# Patient Record
Sex: FEMALE | Race: WHITE | URBAN - METROPOLITAN AREA
[De-identification: names, ages, dates, MRNs, and addresses within clinical notes are randomized per-mention and may not be internally consistent; named-entity substitution may affect disease eponyms.]

---

## 2017-02-22 ENCOUNTER — OUTPATIENT (OUTPATIENT)
Dept: OUTPATIENT SERVICES | Facility: HOSPITAL | Age: 82
LOS: 1 days | Discharge: HOME | End: 2017-02-22
Payer: MEDICARE

## 2017-02-22 VITALS
TEMPERATURE: 98 F | SYSTOLIC BLOOD PRESSURE: 132 MMHG | HEIGHT: 70 IN | WEIGHT: 145.95 LBS | OXYGEN SATURATION: 98 % | HEART RATE: 61 BPM | DIASTOLIC BLOOD PRESSURE: 80 MMHG | RESPIRATION RATE: 20 BRPM

## 2017-02-22 DIAGNOSIS — Z96.642 PRESENCE OF LEFT ARTIFICIAL HIP JOINT: ICD-10-CM

## 2017-02-22 DIAGNOSIS — E89.0 POSTPROCEDURAL HYPOTHYROIDISM: Chronic | ICD-10-CM

## 2017-02-22 DIAGNOSIS — Z79.82 LONG TERM (CURRENT) USE OF ASPIRIN: ICD-10-CM

## 2017-02-22 DIAGNOSIS — Z98.890 OTHER SPECIFIED POSTPROCEDURAL STATES: Chronic | ICD-10-CM

## 2017-02-22 DIAGNOSIS — E03.9 HYPOTHYROIDISM, UNSPECIFIED: ICD-10-CM

## 2017-02-22 DIAGNOSIS — Z98.49 CATARACT EXTRACTION STATUS, UNSPECIFIED EYE: Chronic | ICD-10-CM

## 2017-02-22 DIAGNOSIS — I44.7 LEFT BUNDLE-BRANCH BLOCK, UNSPECIFIED: ICD-10-CM

## 2017-02-22 DIAGNOSIS — M17.11 UNILATERAL PRIMARY OSTEOARTHRITIS, RIGHT KNEE: ICD-10-CM

## 2017-02-22 DIAGNOSIS — E78.5 HYPERLIPIDEMIA, UNSPECIFIED: ICD-10-CM

## 2017-02-22 DIAGNOSIS — Z96.642 PRESENCE OF LEFT ARTIFICIAL HIP JOINT: Chronic | ICD-10-CM

## 2017-02-22 DIAGNOSIS — Z01.818 ENCOUNTER FOR OTHER PREPROCEDURAL EXAMINATION: ICD-10-CM

## 2017-02-22 LAB
ANION GAP SERPL CALC-SCNC: 6 MMOL/L — SIGNIFICANT CHANGE UP (ref 5–17)
APPEARANCE UR: CLEAR — SIGNIFICANT CHANGE UP
BACTERIA # UR AUTO: (no result)
BASOPHILS # BLD AUTO: 0.1 K/UL — SIGNIFICANT CHANGE UP (ref 0–0.2)
BASOPHILS NFR BLD AUTO: 1.3 % — SIGNIFICANT CHANGE UP (ref 0–2)
BILIRUB UR-MCNC: NEGATIVE — SIGNIFICANT CHANGE UP
BLD GP AB SCN SERPL QL: SIGNIFICANT CHANGE UP
BUN SERPL-MCNC: 22 MG/DL — SIGNIFICANT CHANGE UP (ref 7–23)
CALCIUM SERPL-MCNC: 9.7 MG/DL — SIGNIFICANT CHANGE UP (ref 8.5–10.1)
CHLORIDE SERPL-SCNC: 107 MMOL/L — SIGNIFICANT CHANGE UP (ref 96–108)
CO2 SERPL-SCNC: 28 MMOL/L — SIGNIFICANT CHANGE UP (ref 22–31)
COLOR SPEC: YELLOW — SIGNIFICANT CHANGE UP
CREAT SERPL-MCNC: 0.7 MG/DL — SIGNIFICANT CHANGE UP (ref 0.5–1.3)
DIFF PNL FLD: (no result)
EOSINOPHIL # BLD AUTO: 0.1 K/UL — SIGNIFICANT CHANGE UP (ref 0–0.5)
EOSINOPHIL NFR BLD AUTO: 2.3 % — SIGNIFICANT CHANGE UP (ref 0–6)
EPI CELLS # UR: SIGNIFICANT CHANGE UP
GLUCOSE SERPL-MCNC: 91 MG/DL — SIGNIFICANT CHANGE UP (ref 70–99)
GLUCOSE UR QL: NEGATIVE MG/DL — SIGNIFICANT CHANGE UP
HCT VFR BLD CALC: 36.6 % — SIGNIFICANT CHANGE UP (ref 34.5–45)
HGB BLD-MCNC: 12.1 G/DL — SIGNIFICANT CHANGE UP (ref 11.5–15.5)
INR BLD: 1.03 RATIO — SIGNIFICANT CHANGE UP (ref 0.88–1.16)
KETONES UR-MCNC: NEGATIVE — SIGNIFICANT CHANGE UP
LEUKOCYTE ESTERASE UR-ACNC: NEGATIVE — SIGNIFICANT CHANGE UP
LYMPHOCYTES # BLD AUTO: 1.2 K/UL — SIGNIFICANT CHANGE UP (ref 1–3.3)
LYMPHOCYTES # BLD AUTO: 22.8 % — SIGNIFICANT CHANGE UP (ref 13–44)
MCHC RBC-ENTMCNC: 30 PG — SIGNIFICANT CHANGE UP (ref 27–34)
MCHC RBC-ENTMCNC: 33 GM/DL — SIGNIFICANT CHANGE UP (ref 32–36)
MCV RBC AUTO: 90.8 FL — SIGNIFICANT CHANGE UP (ref 80–100)
MONOCYTES # BLD AUTO: 0.7 K/UL — SIGNIFICANT CHANGE UP (ref 0–0.9)
MONOCYTES NFR BLD AUTO: 12.1 % — SIGNIFICANT CHANGE UP (ref 2–14)
MRSA PCR RESULT.: SIGNIFICANT CHANGE UP
NEUTROPHILS # BLD AUTO: 3.4 K/UL — SIGNIFICANT CHANGE UP (ref 1.8–7.4)
NEUTROPHILS NFR BLD AUTO: 61.6 % — SIGNIFICANT CHANGE UP (ref 43–77)
NITRITE UR-MCNC: NEGATIVE — SIGNIFICANT CHANGE UP
PH UR: 6.5 — SIGNIFICANT CHANGE UP (ref 4.8–8)
PLATELET # BLD AUTO: 211 K/UL — SIGNIFICANT CHANGE UP (ref 150–400)
POTASSIUM SERPL-MCNC: 4.8 MMOL/L — SIGNIFICANT CHANGE UP (ref 3.5–5.3)
POTASSIUM SERPL-SCNC: 4.8 MMOL/L — SIGNIFICANT CHANGE UP (ref 3.5–5.3)
PROT UR-MCNC: NEGATIVE MG/DL — SIGNIFICANT CHANGE UP
PROTHROM AB SERPL-ACNC: 11.3 SEC — SIGNIFICANT CHANGE UP (ref 10–13.1)
RBC # BLD: 4.03 M/UL — SIGNIFICANT CHANGE UP (ref 3.8–5.2)
RBC # FLD: 12.4 % — SIGNIFICANT CHANGE UP (ref 10.3–14.5)
RBC CASTS # UR COMP ASSIST: (no result) /HPF (ref 0–4)
S AUREUS DNA NOSE QL NAA+PROBE: SIGNIFICANT CHANGE UP
SODIUM SERPL-SCNC: 141 MMOL/L — SIGNIFICANT CHANGE UP (ref 135–145)
SP GR SPEC: 1.01 — SIGNIFICANT CHANGE UP (ref 1.01–1.02)
TYPE + AB SCN PNL BLD: SIGNIFICANT CHANGE UP
UROBILINOGEN FLD QL: NEGATIVE MG/DL — SIGNIFICANT CHANGE UP
WBC # BLD: 5.4 K/UL — SIGNIFICANT CHANGE UP (ref 3.8–10.5)
WBC # FLD AUTO: 5.4 K/UL — SIGNIFICANT CHANGE UP (ref 3.8–10.5)
WBC UR QL: SIGNIFICANT CHANGE UP

## 2017-02-22 PROCEDURE — 71020: CPT | Mod: 26

## 2017-02-22 PROCEDURE — 93010 ELECTROCARDIOGRAM REPORT: CPT

## 2017-02-22 NOTE — H&P PST ADULT - PSH
H/O breast biopsy  15 yrs ago  H/O cataract extraction  b/l 2009  H/O hernia repair  2x   10 yrs ago, 5 yrs ago  H/O partial thyroidectomy  1964  H/O: knee surgery  right knee meniscectomy - 5 yrs ago  left knee surgery - 40 yrs ago  History of hip replacement, total, left  2009  History of shoulder surgery  right shoulder open surgery - 8 yrs ago  S/P coronary angiogram  [facility]2016 [facility]  Pt has MYNX vascular closure device H/O breast biopsy  15 yrs ago  H/O cataract extraction  b/l 2009  H/O hernia repair  2x   10 yrs ago, 5 yrs ago  H/O partial thyroidectomy  1964  H/O: knee surgery  right knee meniscectomy - 5 yrs ago  left knee surgery - 40 yrs ago  History of hip replacement, total, left  2009  History of shoulder surgery  right shoulder open surgery - 8 yrs ago  S/P coronary angiogram  [facility]  Pt has MYNX vascular closure device

## 2017-02-22 NOTE — H&P PST ADULT - NSANTHOSAYNRD_GEN_A_CORE
No. LASHELL screening performed.  STOP BANG Legend: 0-2 = LOW Risk; 3-4 = INTERMEDIATE Risk; 5-8 = HIGH Risk

## 2017-02-22 NOTE — H&P PST ADULT - HISTORY OF PRESENT ILLNESS
81 y/o female with osteoarthritis of right knee. Complain of right knee pain. Pt has difficulty walking distances due to knee pain. Takes Tylenol with mild pain relief. Here today for PST for planned right TKR.

## 2017-02-22 NOTE — H&P PST ADULT - ASSESSMENT
83 y/o female with osteoarthritis of right knee. Scheduled for [facility] TKR with Dr. Giang.    Plan  1. Stop all NSAIDS, herbal supplements and vitamins for 7 days.  2. NPO at midnight.  3. Take the following medications (Toprol, Quinapril, Levothyroxine)  with small sips of water on the morning of your procedure/surgery.  4. Use EZ sponges as directed  5. Use mupirocin as directed

## 2017-02-22 NOTE — H&P PST ADULT - FAMILY HISTORY
Mother  Still living? No  Family history of colon cancer in mother, Age at diagnosis: Age Unknown     Grandparent  Still living? No  Family history of ischemic heart disease (IHD), Age at diagnosis: Age Unknown

## 2017-03-06 ENCOUNTER — RESULT REVIEW (OUTPATIENT)
Age: 82
End: 2017-03-06

## 2017-03-06 RX ORDER — ACETAMINOPHEN 500 MG
650 TABLET ORAL ONCE
Qty: 0 | Refills: 0 | Status: COMPLETED | OUTPATIENT
Start: 2017-03-07 | End: 2017-03-07

## 2017-03-06 RX ORDER — CELECOXIB 200 MG/1
200 CAPSULE ORAL ONCE
Qty: 0 | Refills: 0 | Status: COMPLETED | OUTPATIENT
Start: 2017-03-07 | End: 2017-03-07

## 2017-03-06 RX ORDER — FENTANYL CITRATE 50 UG/ML
50 INJECTION INTRAVENOUS
Qty: 0 | Refills: 0 | Status: DISCONTINUED | OUTPATIENT
Start: 2017-03-07 | End: 2017-03-09

## 2017-03-06 RX ORDER — ACETAMINOPHEN 500 MG
650 TABLET ORAL EVERY 6 HOURS
Qty: 0 | Refills: 0 | Status: DISCONTINUED | OUTPATIENT
Start: 2017-03-07 | End: 2017-03-09

## 2017-03-06 RX ORDER — SENNA PLUS 8.6 MG/1
2 TABLET ORAL AT BEDTIME
Qty: 0 | Refills: 0 | Status: DISCONTINUED | OUTPATIENT
Start: 2017-03-07 | End: 2017-03-09

## 2017-03-06 RX ORDER — OXYCODONE HYDROCHLORIDE 5 MG/1
10 TABLET ORAL ONCE
Qty: 0 | Refills: 0 | Status: DISCONTINUED | OUTPATIENT
Start: 2017-03-07 | End: 2017-03-09

## 2017-03-06 RX ORDER — CELECOXIB 200 MG/1
200 CAPSULE ORAL
Qty: 0 | Refills: 0 | Status: DISCONTINUED | OUTPATIENT
Start: 2017-03-07 | End: 2017-03-09

## 2017-03-06 RX ORDER — HYDROMORPHONE HYDROCHLORIDE 2 MG/ML
0.5 INJECTION INTRAMUSCULAR; INTRAVENOUS; SUBCUTANEOUS
Qty: 0 | Refills: 0 | Status: DISCONTINUED | OUTPATIENT
Start: 2017-03-07 | End: 2017-03-09

## 2017-03-06 RX ORDER — ONDANSETRON 8 MG/1
4 TABLET, FILM COATED ORAL ONCE
Qty: 0 | Refills: 0 | Status: DISCONTINUED | OUTPATIENT
Start: 2017-03-07 | End: 2017-03-09

## 2017-03-07 ENCOUNTER — INPATIENT (INPATIENT)
Facility: HOSPITAL | Age: 82
LOS: 1 days | Discharge: HOME HEALTH | End: 2017-03-09
Attending: ORTHOPAEDIC SURGERY | Admitting: ORTHOPAEDIC SURGERY
Payer: MEDICARE

## 2017-03-07 ENCOUNTER — TRANSCRIPTION ENCOUNTER (OUTPATIENT)
Age: 82
End: 2017-03-07

## 2017-03-07 VITALS
RESPIRATION RATE: 16 BRPM | SYSTOLIC BLOOD PRESSURE: 128 MMHG | WEIGHT: 145.95 LBS | OXYGEN SATURATION: 100 % | TEMPERATURE: 97 F | DIASTOLIC BLOOD PRESSURE: 78 MMHG | HEART RATE: 60 BPM | HEIGHT: 70 IN

## 2017-03-07 DIAGNOSIS — Z98.890 OTHER SPECIFIED POSTPROCEDURAL STATES: Chronic | ICD-10-CM

## 2017-03-07 DIAGNOSIS — E89.0 POSTPROCEDURAL HYPOTHYROIDISM: Chronic | ICD-10-CM

## 2017-03-07 DIAGNOSIS — Z98.49 CATARACT EXTRACTION STATUS, UNSPECIFIED EYE: Chronic | ICD-10-CM

## 2017-03-07 DIAGNOSIS — Z96.642 PRESENCE OF LEFT ARTIFICIAL HIP JOINT: Chronic | ICD-10-CM

## 2017-03-07 LAB
ANION GAP SERPL CALC-SCNC: 11 MMOL/L — SIGNIFICANT CHANGE UP (ref 5–17)
BUN SERPL-MCNC: 23 MG/DL — SIGNIFICANT CHANGE UP (ref 7–23)
CALCIUM SERPL-MCNC: 8.8 MG/DL — SIGNIFICANT CHANGE UP (ref 8.5–10.1)
CHLORIDE SERPL-SCNC: 110 MMOL/L — HIGH (ref 96–108)
CO2 SERPL-SCNC: 24 MMOL/L — SIGNIFICANT CHANGE UP (ref 22–31)
CREAT SERPL-MCNC: 0.77 MG/DL — SIGNIFICANT CHANGE UP (ref 0.5–1.3)
GLUCOSE SERPL-MCNC: 128 MG/DL — HIGH (ref 70–99)
HCT VFR BLD CALC: 32.5 % — LOW (ref 34.5–45)
HGB BLD-MCNC: 10.9 G/DL — LOW (ref 11.5–15.5)
INR BLD: 1.06 RATIO — SIGNIFICANT CHANGE UP (ref 0.88–1.16)
MCHC RBC-ENTMCNC: 30.8 PG — SIGNIFICANT CHANGE UP (ref 27–34)
MCHC RBC-ENTMCNC: 33.6 GM/DL — SIGNIFICANT CHANGE UP (ref 32–36)
MCV RBC AUTO: 91.7 FL — SIGNIFICANT CHANGE UP (ref 80–100)
PLATELET # BLD AUTO: 192 K/UL — SIGNIFICANT CHANGE UP (ref 150–400)
POTASSIUM SERPL-MCNC: 4.1 MMOL/L — SIGNIFICANT CHANGE UP (ref 3.5–5.3)
POTASSIUM SERPL-SCNC: 4.1 MMOL/L — SIGNIFICANT CHANGE UP (ref 3.5–5.3)
PROTHROM AB SERPL-ACNC: 11.7 SEC — SIGNIFICANT CHANGE UP (ref 10–13.1)
RBC # BLD: 3.54 M/UL — LOW (ref 3.8–5.2)
RBC # FLD: 12.4 % — SIGNIFICANT CHANGE UP (ref 10.3–14.5)
SODIUM SERPL-SCNC: 145 MMOL/L — SIGNIFICANT CHANGE UP (ref 135–145)
WBC # BLD: 4.6 K/UL — SIGNIFICANT CHANGE UP (ref 3.8–10.5)
WBC # FLD AUTO: 4.6 K/UL — SIGNIFICANT CHANGE UP (ref 3.8–10.5)

## 2017-03-07 PROCEDURE — 88305 TISSUE EXAM BY PATHOLOGIST: CPT | Mod: 26

## 2017-03-07 PROCEDURE — 73560 X-RAY EXAM OF KNEE 1 OR 2: CPT | Mod: 26,RT

## 2017-03-07 RX ORDER — QUINAPRIL HYDROCHLORIDE 40 MG/1
1 TABLET, FILM COATED ORAL
Qty: 0 | Refills: 0 | COMMUNITY

## 2017-03-07 RX ORDER — MORPHINE SULFATE 50 MG/1
2 CAPSULE, EXTENDED RELEASE ORAL EVERY 4 HOURS
Qty: 0 | Refills: 0 | Status: DISCONTINUED | OUTPATIENT
Start: 2017-03-07 | End: 2017-03-09

## 2017-03-07 RX ORDER — FOLIC ACID 0.8 MG
1 TABLET ORAL DAILY
Qty: 0 | Refills: 0 | Status: DISCONTINUED | OUTPATIENT
Start: 2017-03-07 | End: 2017-03-09

## 2017-03-07 RX ORDER — FENTANYL CITRATE 50 UG/ML
50 INJECTION INTRAVENOUS
Qty: 0 | Refills: 0 | Status: DISCONTINUED | OUTPATIENT
Start: 2017-03-07 | End: 2017-03-07

## 2017-03-07 RX ORDER — POLYETHYLENE GLYCOL 3350 17 G/17G
17 POWDER, FOR SOLUTION ORAL DAILY
Qty: 0 | Refills: 0 | Status: DISCONTINUED | OUTPATIENT
Start: 2017-03-07 | End: 2017-03-09

## 2017-03-07 RX ORDER — ONDANSETRON 8 MG/1
4 TABLET, FILM COATED ORAL ONCE
Qty: 0 | Refills: 0 | Status: DISCONTINUED | OUTPATIENT
Start: 2017-03-07 | End: 2017-03-07

## 2017-03-07 RX ORDER — METOPROLOL TARTRATE 50 MG
1 TABLET ORAL
Qty: 0 | Refills: 0 | COMMUNITY

## 2017-03-07 RX ORDER — MAGNESIUM HYDROXIDE 400 MG/1
30 TABLET, CHEWABLE ORAL DAILY
Qty: 0 | Refills: 0 | Status: DISCONTINUED | OUTPATIENT
Start: 2017-03-07 | End: 2017-03-09

## 2017-03-07 RX ORDER — DOCUSATE SODIUM 100 MG
100 CAPSULE ORAL EVERY 12 HOURS
Qty: 0 | Refills: 0 | Status: DISCONTINUED | OUTPATIENT
Start: 2017-03-07 | End: 2017-03-07

## 2017-03-07 RX ORDER — TRANEXAMIC ACID 100 MG/ML
662 INJECTION, SOLUTION INTRAVENOUS
Qty: 0 | Refills: 0 | Status: DISCONTINUED | OUTPATIENT
Start: 2017-03-07 | End: 2017-03-09

## 2017-03-07 RX ORDER — MULTIVIT-MIN/FERROUS GLUCONATE 9 MG/15 ML
1 LIQUID (ML) ORAL
Qty: 0 | Refills: 0 | COMMUNITY

## 2017-03-07 RX ORDER — CODEINE SULFATE 60 MG/1
15 TABLET ORAL EVERY 4 HOURS
Qty: 0 | Refills: 0 | Status: DISCONTINUED | OUTPATIENT
Start: 2017-03-07 | End: 2017-03-09

## 2017-03-07 RX ORDER — FERROUS SULFATE 325(65) MG
325 TABLET ORAL
Qty: 0 | Refills: 0 | Status: DISCONTINUED | OUTPATIENT
Start: 2017-03-07 | End: 2017-03-09

## 2017-03-07 RX ORDER — OXYCODONE HYDROCHLORIDE 5 MG/1
10 TABLET ORAL EVERY 12 HOURS
Qty: 0 | Refills: 0 | Status: DISCONTINUED | OUTPATIENT
Start: 2017-03-07 | End: 2017-03-09

## 2017-03-07 RX ORDER — OXYCODONE HYDROCHLORIDE 5 MG/1
5 TABLET ORAL EVERY 4 HOURS
Qty: 0 | Refills: 0 | Status: DISCONTINUED | OUTPATIENT
Start: 2017-03-07 | End: 2017-03-09

## 2017-03-07 RX ORDER — ASPIRIN/CALCIUM CARB/MAGNESIUM 324 MG
1 TABLET ORAL
Qty: 0 | Refills: 0 | COMMUNITY

## 2017-03-07 RX ORDER — VANCOMYCIN HCL 1 G
1000 VIAL (EA) INTRAVENOUS ONCE
Qty: 0 | Refills: 0 | Status: COMPLETED | OUTPATIENT
Start: 2017-03-07 | End: 2017-03-07

## 2017-03-07 RX ORDER — ACETAMINOPHEN 500 MG
650 TABLET ORAL EVERY 6 HOURS
Qty: 0 | Refills: 0 | Status: DISCONTINUED | OUTPATIENT
Start: 2017-03-07 | End: 2017-03-09

## 2017-03-07 RX ORDER — OXYCODONE HYDROCHLORIDE 5 MG/1
10 TABLET ORAL EVERY 4 HOURS
Qty: 0 | Refills: 0 | Status: DISCONTINUED | OUTPATIENT
Start: 2017-03-07 | End: 2017-03-09

## 2017-03-07 RX ORDER — OXYCODONE HYDROCHLORIDE 5 MG/1
5 TABLET ORAL EVERY 12 HOURS
Qty: 0 | Refills: 0 | Status: DISCONTINUED | OUTPATIENT
Start: 2017-03-07 | End: 2017-03-07

## 2017-03-07 RX ORDER — LEVOTHYROXINE SODIUM 125 MCG
112 TABLET ORAL DAILY
Qty: 0 | Refills: 0 | Status: DISCONTINUED | OUTPATIENT
Start: 2017-03-07 | End: 2017-03-09

## 2017-03-07 RX ORDER — SODIUM CHLORIDE 9 MG/ML
1000 INJECTION, SOLUTION INTRAVENOUS
Qty: 0 | Refills: 0 | Status: DISCONTINUED | OUTPATIENT
Start: 2017-03-07 | End: 2017-03-09

## 2017-03-07 RX ORDER — ASPIRIN/CALCIUM CARB/MAGNESIUM 324 MG
325 TABLET ORAL
Qty: 0 | Refills: 0 | Status: DISCONTINUED | OUTPATIENT
Start: 2017-03-07 | End: 2017-03-08

## 2017-03-07 RX ORDER — SODIUM CHLORIDE 9 MG/ML
3 INJECTION INTRAMUSCULAR; INTRAVENOUS; SUBCUTANEOUS EVERY 8 HOURS
Qty: 0 | Refills: 0 | Status: DISCONTINUED | OUTPATIENT
Start: 2017-03-07 | End: 2017-03-09

## 2017-03-07 RX ORDER — SODIUM CHLORIDE 9 MG/ML
1000 INJECTION, SOLUTION INTRAVENOUS
Qty: 0 | Refills: 0 | Status: DISCONTINUED | OUTPATIENT
Start: 2017-03-07 | End: 2017-03-07

## 2017-03-07 RX ORDER — ASCORBIC ACID 60 MG
500 TABLET,CHEWABLE ORAL
Qty: 0 | Refills: 0 | Status: DISCONTINUED | OUTPATIENT
Start: 2017-03-07 | End: 2017-03-09

## 2017-03-07 RX ORDER — SENNA PLUS 8.6 MG/1
2 TABLET ORAL AT BEDTIME
Qty: 0 | Refills: 0 | Status: DISCONTINUED | OUTPATIENT
Start: 2017-03-07 | End: 2017-03-09

## 2017-03-07 RX ORDER — LISINOPRIL 2.5 MG/1
10 TABLET ORAL DAILY
Qty: 0 | Refills: 0 | Status: DISCONTINUED | OUTPATIENT
Start: 2017-03-07 | End: 2017-03-09

## 2017-03-07 RX ORDER — OXYCODONE HYDROCHLORIDE 5 MG/1
10 TABLET ORAL EVERY 6 HOURS
Qty: 0 | Refills: 0 | Status: DISCONTINUED | OUTPATIENT
Start: 2017-03-07 | End: 2017-03-07

## 2017-03-07 RX ORDER — ONDANSETRON 8 MG/1
4 TABLET, FILM COATED ORAL EVERY 6 HOURS
Qty: 0 | Refills: 0 | Status: DISCONTINUED | OUTPATIENT
Start: 2017-03-07 | End: 2017-03-09

## 2017-03-07 RX ORDER — OMEGA-3 ACID ETHYL ESTERS 1 G
1 CAPSULE ORAL
Qty: 0 | Refills: 0 | COMMUNITY

## 2017-03-07 RX ORDER — OXYCODONE HYDROCHLORIDE 5 MG/1
5 TABLET ORAL EVERY 6 HOURS
Qty: 0 | Refills: 0 | Status: DISCONTINUED | OUTPATIENT
Start: 2017-03-07 | End: 2017-03-07

## 2017-03-07 RX ORDER — LEVOTHYROXINE SODIUM 125 MCG
1 TABLET ORAL
Qty: 0 | Refills: 0 | COMMUNITY

## 2017-03-07 RX ORDER — DOCUSATE SODIUM 100 MG
100 CAPSULE ORAL THREE TIMES A DAY
Qty: 0 | Refills: 0 | Status: DISCONTINUED | OUTPATIENT
Start: 2017-03-07 | End: 2017-03-09

## 2017-03-07 RX ORDER — METOPROLOL TARTRATE 50 MG
25 TABLET ORAL DAILY
Qty: 0 | Refills: 0 | Status: DISCONTINUED | OUTPATIENT
Start: 2017-03-07 | End: 2017-03-09

## 2017-03-07 RX ORDER — MORPHINE SULFATE 50 MG/1
4 CAPSULE, EXTENDED RELEASE ORAL ONCE
Qty: 0 | Refills: 0 | Status: DISCONTINUED | OUTPATIENT
Start: 2017-03-07 | End: 2017-03-09

## 2017-03-07 RX ADMIN — Medication 650 MILLIGRAM(S): at 18:22

## 2017-03-07 RX ADMIN — Medication 650 MILLIGRAM(S): at 19:26

## 2017-03-07 RX ADMIN — SODIUM CHLORIDE 3 MILLILITER(S): 9 INJECTION INTRAMUSCULAR; INTRAVENOUS; SUBCUTANEOUS at 22:00

## 2017-03-07 RX ADMIN — Medication 650 MILLIGRAM(S): at 08:01

## 2017-03-07 RX ADMIN — SENNA PLUS 2 TABLET(S): 8.6 TABLET ORAL at 21:51

## 2017-03-07 RX ADMIN — Medication 650 MILLIGRAM(S): at 23:26

## 2017-03-07 RX ADMIN — CELECOXIB 200 MILLIGRAM(S): 200 CAPSULE ORAL at 08:01

## 2017-03-07 RX ADMIN — Medication 325 MILLIGRAM(S): at 18:25

## 2017-03-07 RX ADMIN — Medication 100 MILLIGRAM(S): at 21:51

## 2017-03-07 RX ADMIN — Medication 325 MILLIGRAM(S): at 18:36

## 2017-03-07 RX ADMIN — Medication 250 MILLIGRAM(S): at 21:52

## 2017-03-07 RX ADMIN — Medication 500 MILLIGRAM(S): at 18:23

## 2017-03-07 RX ADMIN — SODIUM CHLORIDE 75 MILLILITER(S): 9 INJECTION, SOLUTION INTRAVENOUS at 13:13

## 2017-03-07 NOTE — PATIENT PROFILE ADULT. - PSH
H/O breast biopsy  15 yrs ago  H/O cataract extraction  b/l 2009  H/O hernia repair  2x   10 yrs ago, 5 yrs ago  H/O partial thyroidectomy  1964  H/O: knee surgery  right knee meniscectomy - 5 yrs ago  left knee surgery - 40 yrs ago  History of hip replacement, total, left  2009  History of shoulder surgery  right shoulder open surgery - 8 yrs ago  S/P coronary angiogram  [facility]  Pt has MYNX vascular closure device

## 2017-03-07 NOTE — DISCHARGE NOTE ADULT - MEDICATION SUMMARY - MEDICATIONS TO TAKE
I will START or STAY ON the medications listed below when I get home from the hospital:    aspirin 81 mg oral tablet  -- 1 tab(s) by mouth once a day  -- hold 1 week before surgery  -- Indication: For home med    Percocet 5/325 325 mg-5 mg oral tablet  -- 1 tab(s) by mouth every 4 hours, As Needed MDD:6 tabs for pain  -- Caution federal law prohibits the transfer of this drug to any person other  than the person for whom it was prescribed.  May cause drowsiness.  Alcohol may intensify this effect.  Use care when operating dangerous machinery.  This prescription cannot be refilled.  This product contains acetaminophen.  Do not use  with any other product containing acetaminophen to prevent possible liver damage.  Using more of this medication than prescribed may cause serious breathing problems.    -- Indication: For pain    quinapril 10 mg oral tablet  -- 1 tab(s) by mouth once a day  -- Indication: For home med    Lovenox 30 mg/0.3 mL injectable solution  -- 0.3 milligram(s) injectable 2 times a day, under the direction of Anticoagulation Management  -- It is very important that you take or use this exactly as directed.  Do not skip doses or discontinue unless directed by your doctor.    -- Indication: For DVT PPx    warfarin 5 mg oral tablet  -- 1 tab(s) by mouth once a day  -- Indication: For DVT PPx    Toprol-XL 25 mg oral tablet, extended release  -- 1 tab(s) by mouth once a day  -- Indication: For home med    red yeast rice 600 mg oral capsule  --  by mouth 3 times a day  -- Indication: For home med    Fish Oil  -- 1 cap(s) by mouth 2 times a day  -- Indication: For home med    levothyroxine 112 mcg (0.112 mg) oral tablet  -- 1 tab(s) by mouth once a day  -- Indication: For home med    Caltrate 600 + D oral tablet  -- 1 tab(s) by mouth 2 times a day  -- Indication: For home med    Centrum Silver oral tablet  -- 1 tab(s) by mouth once a day  -- Indication: For home med I will START or STAY ON the medications listed below when I get home from the hospital:    aspirin 81 mg oral tablet  -- 1 tab(s) by mouth once a day  -- hold 1 week before surgery  -- Indication: For home med    Percocet 5/325 325 mg-5 mg oral tablet  -- 1 tab(s) by mouth every 4 hours, As Needed MDD:6 tabs for pain  -- Caution federal law prohibits the transfer of this drug to any person other  than the person for whom it was prescribed.  May cause drowsiness.  Alcohol may intensify this effect.  Use care when operating dangerous machinery.  This prescription cannot be refilled.  This product contains acetaminophen.  Do not use  with any other product containing acetaminophen to prevent possible liver damage.  Using more of this medication than prescribed may cause serious breathing problems.    -- Indication: For pain    quinapril 10 mg oral tablet  -- 1 tab(s) by mouth once a day  -- Indication: For home med    Lovenox 30 mg/0.3 mL injectable solution  -- 0.3 milligram(s) injectable 2 times a day, under the direction of Anticoagulation Management  -- It is very important that you take or use this exactly as directed.  Do not skip doses or discontinue unless directed by your doctor.    -- Indication: For DVT PPx    warfarin 5 mg oral tablet  -- 1 tab(s) by mouth once a day  -- Indication: For DVT PPx    Toprol-XL 25 mg oral tablet, extended release  -- 1 tab(s) by mouth once a day  -- Indication: For home med    red yeast rice 600 mg oral capsule  --  by mouth 3 times a day  -- Indication: For home med    Fish Oil  -- 1 cap(s) by mouth 2 times a day  -- Indication: For home med    pantoprazole 20 mg oral delayed release tablet  -- 1 tab(s) by mouth once a day for GI ppx  -- It is very important that you take or use this exactly as directed.  Do not skip doses or discontinue unless directed by your doctor.  Obtain medical advice before taking any non-prescription drugs as some may affect the action of this medication.  Swallow whole.  Do not crush.    -- Indication: For GI ppx    levothyroxine 112 mcg (0.112 mg) oral tablet  -- 1 tab(s) by mouth once a day  -- Indication: For home med    Caltrate 600 + D oral tablet  -- 1 tab(s) by mouth 2 times a day  -- Indication: For home med    Centrum Silver oral tablet  -- 1 tab(s) by mouth once a day  -- Indication: For home med

## 2017-03-07 NOTE — PROGRESS NOTE ADULT - ASSESSMENT
83yo F s/p R TKA POD# 0  -pain control  -Drain DC'd in PACU after cell saver was DC'd  -no blood to reinfuse from cell saver  -DVT PPx  -WBAT  -PT  -FU Labs  -DC Planning

## 2017-03-07 NOTE — DISCHARGE NOTE ADULT - PLAN OF CARE
Return to ADLs 1.	Pain Control  2.	Walking with full weight bearing as tolerated, with assistive devices (walker/Cane as Needed)  3.	DVT Prophylaxis per anticoagulation team recommendations  4.	PT as needed  5.	Follow up with Dr. Giang as Outpatient in 10-14 Days after Discharge from the Hospital or Rehab. Call Office For Appointment.   6.	Remove Dressing and Staples Post-Op Day 14  7.	Ice affected area as Needed  8.	Keep Dressing Clean and dry.

## 2017-03-07 NOTE — DISCHARGE NOTE ADULT - PATIENT PORTAL LINK FT
“You can access the Follow[facility] Patient Portal, offered by [facility], by registering with the following website: [email or web address]”

## 2017-03-07 NOTE — DISCHARGE NOTE ADULT - CARE PROVIDER_API CALL
Tommy Giang (MD), Orthopaedic Surgery  [address] [place] [address]  Phone: [phone]  Fax: [phone]

## 2017-03-07 NOTE — DISCHARGE NOTE ADULT - CARE PLAN
Principal Discharge DX:	Osteoarthritis  Goal:	Return to ADLs  Instructions for follow-up, activity and diet:	1.	Pain Control  2.	Walking with full weight bearing as tolerated, with assistive devices (walker/Cane as Needed)  3.	DVT Prophylaxis per anticoagulation team recommendations  4.	PT as needed  5.	Follow up with Dr. Giang as Outpatient in 10-14 Days after Discharge from the Hospital or Rehab. Call Office For Appointment.   6.	Remove Dressing and Staples Post-Op Day 14  7.	Ice affected area as Needed  8.	Keep Dressing Clean and dry.

## 2017-03-07 NOTE — DISCHARGE NOTE ADULT - HOSPITAL COURSE
The patient is a 82 year old female status post elective total knee Arthroplasty to the right knee after failing outpatient nonoperative conservative management.  Patient presented to [facility] after being medically cleared for an elective surgical procedure. The patient was taken to the operating room on date mentioned above. Prophylactic antibiotics were started before the procedure and continued for 24 hours.  There were no complications during the procedure and patient tolerated the procedure well.  The patient was transferred to recovery room in stable condition and subsequently to surgical floor.  Patient was placed on Aspirin for anticoagulation.  All home medications were continued.  The patient received physical therapy daily and daily labs were followed.  The dressing was kept clean, dry, intact.  The rest of the hospital stay was unremarkable.

## 2017-03-07 NOTE — PROGRESS NOTE ADULT - SUBJECTIVE AND OBJECTIVE BOX
Pt S/E in PACU, tolerated procedure well, pain controlled, pt states she has a mild headache    AVSS  Gen: NAD, AAOx3    RLE:  Dressing clean dry intact  +EHL/FHL/TA/GS  SILT L3-S1  +DP/PT Pulses  Compartments soft  No calf TTP B/L

## 2017-03-07 NOTE — BRIEF OPERATIVE NOTE - POST-OP DX
Osteoarthritis of right knee, unspecified osteoarthritis type  03/07/2017    Active  Joaquin Martinez

## 2017-03-08 LAB
ANION GAP SERPL CALC-SCNC: 7 MMOL/L — SIGNIFICANT CHANGE UP (ref 5–17)
BUN SERPL-MCNC: 22 MG/DL — SIGNIFICANT CHANGE UP (ref 7–23)
CALCIUM SERPL-MCNC: 8.9 MG/DL — SIGNIFICANT CHANGE UP (ref 8.5–10.1)
CHLORIDE SERPL-SCNC: 108 MMOL/L — SIGNIFICANT CHANGE UP (ref 96–108)
CO2 SERPL-SCNC: 25 MMOL/L — SIGNIFICANT CHANGE UP (ref 22–31)
CREAT SERPL-MCNC: 0.78 MG/DL — SIGNIFICANT CHANGE UP (ref 0.5–1.3)
GLUCOSE SERPL-MCNC: 142 MG/DL — HIGH (ref 70–99)
HCT VFR BLD CALC: 28.2 % — LOW (ref 34.5–45)
HGB BLD-MCNC: 9.2 G/DL — LOW (ref 11.5–15.5)
MCHC RBC-ENTMCNC: 30.1 PG — SIGNIFICANT CHANGE UP (ref 27–34)
MCHC RBC-ENTMCNC: 32.8 GM/DL — SIGNIFICANT CHANGE UP (ref 32–36)
MCV RBC AUTO: 91.7 FL — SIGNIFICANT CHANGE UP (ref 80–100)
PLATELET # BLD AUTO: 192 K/UL — SIGNIFICANT CHANGE UP (ref 150–400)
POTASSIUM SERPL-MCNC: 4.1 MMOL/L — SIGNIFICANT CHANGE UP (ref 3.5–5.3)
POTASSIUM SERPL-SCNC: 4.1 MMOL/L — SIGNIFICANT CHANGE UP (ref 3.5–5.3)
RBC # BLD: 3.07 M/UL — LOW (ref 3.8–5.2)
RBC # FLD: 12.4 % — SIGNIFICANT CHANGE UP (ref 10.3–14.5)
SODIUM SERPL-SCNC: 140 MMOL/L — SIGNIFICANT CHANGE UP (ref 135–145)
SURGICAL PATHOLOGY FINAL REPORT - CH: SIGNIFICANT CHANGE UP
WBC # BLD: 10.4 K/UL — SIGNIFICANT CHANGE UP (ref 3.8–10.5)
WBC # FLD AUTO: 10.4 K/UL — SIGNIFICANT CHANGE UP (ref 3.8–10.5)

## 2017-03-08 PROCEDURE — 99223 1ST HOSP IP/OBS HIGH 75: CPT

## 2017-03-08 RX ORDER — DIPHENHYDRAMINE HCL 50 MG
50 CAPSULE ORAL EVERY 6 HOURS
Qty: 0 | Refills: 0 | Status: DISCONTINUED | OUTPATIENT
Start: 2017-03-08 | End: 2017-03-09

## 2017-03-08 RX ORDER — HEPARIN SODIUM 5000 [USP'U]/ML
5000 INJECTION INTRAVENOUS; SUBCUTANEOUS EVERY 8 HOURS
Qty: 0 | Refills: 0 | Status: DISCONTINUED | OUTPATIENT
Start: 2017-03-08 | End: 2017-03-09

## 2017-03-08 RX ORDER — WARFARIN SODIUM 2.5 MG/1
5 TABLET ORAL DAILY
Qty: 0 | Refills: 0 | Status: DISCONTINUED | OUTPATIENT
Start: 2017-03-08 | End: 2017-03-09

## 2017-03-08 RX ADMIN — Medication 650 MILLIGRAM(S): at 05:08

## 2017-03-08 RX ADMIN — OXYCODONE HYDROCHLORIDE 5 MILLIGRAM(S): 5 TABLET ORAL at 11:21

## 2017-03-08 RX ADMIN — Medication 650 MILLIGRAM(S): at 16:40

## 2017-03-08 RX ADMIN — Medication 100 MILLIGRAM(S): at 22:01

## 2017-03-08 RX ADMIN — SENNA PLUS 2 TABLET(S): 8.6 TABLET ORAL at 22:01

## 2017-03-08 RX ADMIN — CODEINE SULFATE 15 MILLIGRAM(S): 60 TABLET ORAL at 07:49

## 2017-03-08 RX ADMIN — CODEINE SULFATE 15 MILLIGRAM(S): 60 TABLET ORAL at 05:05

## 2017-03-08 RX ADMIN — Medication 650 MILLIGRAM(S): at 11:22

## 2017-03-08 RX ADMIN — Medication 650 MILLIGRAM(S): at 16:41

## 2017-03-08 RX ADMIN — SODIUM CHLORIDE 3 MILLILITER(S): 9 INJECTION INTRAMUSCULAR; INTRAVENOUS; SUBCUTANEOUS at 15:23

## 2017-03-08 RX ADMIN — CELECOXIB 200 MILLIGRAM(S): 200 CAPSULE ORAL at 08:47

## 2017-03-08 RX ADMIN — WARFARIN SODIUM 5 MILLIGRAM(S): 2.5 TABLET ORAL at 22:01

## 2017-03-08 RX ADMIN — Medication 325 MILLIGRAM(S): at 05:10

## 2017-03-08 RX ADMIN — LISINOPRIL 10 MILLIGRAM(S): 2.5 TABLET ORAL at 05:15

## 2017-03-08 RX ADMIN — Medication 500 MILLIGRAM(S): at 16:41

## 2017-03-08 RX ADMIN — POLYETHYLENE GLYCOL 3350 17 GRAM(S): 17 POWDER, FOR SOLUTION ORAL at 11:23

## 2017-03-08 RX ADMIN — Medication 650 MILLIGRAM(S): at 06:10

## 2017-03-08 RX ADMIN — HEPARIN SODIUM 5000 UNIT(S): 5000 INJECTION INTRAVENOUS; SUBCUTANEOUS at 22:01

## 2017-03-08 RX ADMIN — OXYCODONE HYDROCHLORIDE 5 MILLIGRAM(S): 5 TABLET ORAL at 21:00

## 2017-03-08 RX ADMIN — SODIUM CHLORIDE 3 MILLILITER(S): 9 INJECTION INTRAMUSCULAR; INTRAVENOUS; SUBCUTANEOUS at 22:10

## 2017-03-08 RX ADMIN — Medication 25 MILLIGRAM(S): at 05:14

## 2017-03-08 RX ADMIN — Medication 112 MICROGRAM(S): at 05:10

## 2017-03-08 RX ADMIN — OXYCODONE HYDROCHLORIDE 5 MILLIGRAM(S): 5 TABLET ORAL at 19:51

## 2017-03-08 RX ADMIN — Medication 500 MILLIGRAM(S): at 05:09

## 2017-03-08 RX ADMIN — CODEINE SULFATE 15 MILLIGRAM(S): 60 TABLET ORAL at 00:59

## 2017-03-08 RX ADMIN — Medication 1 MILLIGRAM(S): at 11:23

## 2017-03-08 RX ADMIN — CODEINE SULFATE 15 MILLIGRAM(S): 60 TABLET ORAL at 01:47

## 2017-03-08 RX ADMIN — Medication 1 TABLET(S): at 11:22

## 2017-03-08 RX ADMIN — CODEINE SULFATE 15 MILLIGRAM(S): 60 TABLET ORAL at 15:19

## 2017-03-08 RX ADMIN — Medication 100 MILLIGRAM(S): at 16:40

## 2017-03-08 NOTE — CONSULT NOTE ADULT - SUBJECTIVE AND OBJECTIVE BOX
PCP: DR Caleb Martinez    HPI:  81yo/F with PMH HTN, hypothyroidism, cataracts, prior LT THR presented for elective RT TKR. Medical consult called for postop medical management.    PMH- as above  PSH- cataract surgery, thyroidectomy, LT THR  Soc hx- denies smoking, no alcohol  Fam hx- non-contributory    3/7/17-no active complaints    Review of system- All 10 systems reviewed and is as per HPI otherwise negative.   T(C): 36.2, Max: 36.9 (03-07 @ 14:00)  HR: 56 (55 - 67)  BP: 122/57 (102/44 - 128/78)  RR: 20 (11 - 20)  SpO2: 100% (98% - 100%)  Wt(kg): --    LABS:                        10.9   4.6   )-----------( 192      ( 07 Mar 2017 12:54 )             32.5     07 Mar 2017 12:54    145    |  110    |  23     ----------------------------<  128    4.1     |  24     |  0.77     Ca    8.8        07 Mar 2017 12:54    PT/INR - ( 07 Mar 2017 12:55 )   PT: 11.7 sec;   INR: 1.06 ratio       PHYSICAL EXAM:    GENERAL: NAD, well-groomed, well-developed  HEAD:  Atraumatic, Normocephalic  EYES: EOMI, PERRLA, conjunctiva and sclera clear  HEENT: Moist mucous membranes  NECK: Supple, No JVD  NERVOUS SYSTEM:  Alert & Oriented X3, Motor Strength 5/5 B/L upper and lower extremities; DTRs 2+ intact and symmetric  CHEST/LUNG: Clear to auscultation bilaterally; No rales, rhonchi, wheezing, or rubs  HEART: Regular rate and rhythm; No murmurs, rubs, or gallops  ABDOMEN: Soft, Nontender, Nondistended; Bowel sounds present  GENITOURINARY- Voiding, no palpable bladder  EXTREMITIES:  2+ Peripheral Pulses, No clubbing, cyanosis, or edema  MUSCULOSKELTAL- RT knee dressing dry  SKIN-no rash, no lesion  CNS- alert, oriented X3, non focal     Daily Height in cm: 177.8 (07 Mar 2017 07:40)        acetaminophen   Tablet. 650milliGRAM(s) Oral every 6 hours  oxyCODONE ER Tablet 5milliGRAM(s) Oral every 12 hours  celecoxib 200milliGRAM(s) Oral with breakfast  oxyCODONE IR 5milliGRAM(s) Oral every 6 hours PRN  oxyCODONE IR 10milliGRAM(s) Oral every 6 hours PRN  HYDROmorphone  Injectable 0.5milliGRAM(s) IV Push every 3 hours PRN  docusate sodium 100milliGRAM(s) Oral every 12 hours  senna 2Tablet(s) Oral at bedtime  oxyCODONE ER Tablet 10milliGRAM(s) Oral once  fentaNYL    Injectable 50MICROGram(s) IV Push every 5 minutes PRN  ondansetron Injectable 4milliGRAM(s) IV Push once PRN  tranexamic acid IVPB 662milliGRAM(s) IV Intermittent every 1 hour  sodium chloride 0.9% lock flush 3milliLiter(s) IV Push every 8 hours  aspirin enteric coated 325milliGRAM(s) Oral two times a day  acetaminophen   Tablet 650milliGRAM(s) Oral every 6 hours PRN  oxyCODONE IR 5milliGRAM(s) Oral every 4 hours PRN  oxyCODONE IR 10milliGRAM(s) Oral every 4 hours PRN  morphine  - Injectable 2milliGRAM(s) IV Push every 4 hours PRN  morphine  - Injectable 4milliGRAM(s) IV Push once  vancomycin  IVPB 1000milliGRAM(s) IV Intermittent once  aluminum hydroxide/magnesium hydroxide/simethicone Suspension 30milliLiter(s) Oral four times a day PRN  ondansetron Injectable 4milliGRAM(s) IV Push every 6 hours PRN  magnesium hydroxide Suspension 30milliLiter(s) Oral daily PRN  polyethylene glycol 3350 17Gram(s) Oral daily  senna 2Tablet(s) Oral at bedtime PRN  docusate sodium 100milliGRAM(s) Oral three times a day  ferrous    sulfate 325milliGRAM(s) Oral three times a day with meals  folic acid 1milliGRAM(s) Oral daily  multivitamin 1Tablet(s) Oral daily  ascorbic acid 500milliGRAM(s) Oral two times a day  lisinopril 10milliGRAM(s) Oral daily  metoprolol succinate ER 25milliGRAM(s) Oral daily  levothyroxine 112MICROGram(s) Oral daily  lactated ringers. 1000milliLiter(s) IV Continuous <Continuous>      Assessment  81yo/F with PMH HTN, hypothyroidism, cataracts, prior LT THR presented for elective RT TKR    Plan  #S/p RT TKR  Ortho f/u appreciated  AC consult  PT as tolerated  Bowel regimen, incentive spirometry  Monitor     #Hyperlipidemia/hypothyroidism  Stbale    #Dispo- thank you for consult, will follow with you
HPI:      Patient is a 82y old  Female who presents with a chief complaint of "My right knee hurts when I walk.' (07 Mar 2017 20:15)  pt is s/p right tkr on 3-8-17.    Consulted by Dr. Giang    for VTE prophylaxis, risk stratification, and anticoagulation management.    PAST MEDICAL & SURGICAL HISTORY:  Osteoarthritis: right knee  LBBB (left bundle branch block)  Hypothyroidism  Hyperlipidemia  H/O breast biopsy: 15 yrs ago  History of shoulder surgery: right shoulder open surgery - 8 yrs ago  H/O: knee surgery: right knee meniscectomy - 5 yrs ago  left knee surgery - 40 yrs ago  H/O hernia repair: 2x   10 yrs ago, 5 yrs ago  H/O partial thyroidectomy: 1964  History of hip replacement, total, left: 2009  H/O cataract extraction: b/l 2009  S/P coronary angiogram: 02/2017 [facility]  Pt has MYNX vascular closure device    Caprini VTE Risk Score:8    IMPROVE Bleeding Risk Score1.5 low  crcl: 59.12  bmi:20.9  Falls Risk:   High (  )  Mod (x  )  Low (  )    EBL:  150 ml  3-8-17  Pt seen at bedside discussed her anticoagulation with coumadin and overlapping with heparin sq.  Literature provided, questions answered. will reinforce as needed.   Vital Signs Last 24 Hrs  T(C): 36.3, Max: 36.5 (03-08 @ 04:02)  T(F): 97.3, Max: 97.7 (03-08 @ 04:02)  HR: 58 (55 - 67)  BP: 110/52 (104/49 - 125/47)  BP(mean): --  RR: 16 (12 - 20)  SpO2: 98% (98% - 100%)  FAMILY HISTORY:  Family history of ischemic heart disease (IHD) (Grandparent)  Family history of colon cancer in mother (Mother)    Denies any personal or familial history of clotting or bleeding disorders.    Allergies    Duricef (Other)  epinephrine (Other)  oxycodone (Vomiting)    Intolerances        REVIEW OF SYSTEMS    (  )Fever	     (  )Constipation	(  )SOB				(  )Headache	(  )Dysuria  (  )Chills	     (  )Melena	(  )Dyspnea present on exertion	                    (  )Dizziness                    (  )Polyuria  (  )Nausea	     (  )Hematochezia	(  )Cough			                    (  )Syncope   	(  )Hematuria  (  )Vomiting    (  )Chest Pain	(  )Wheezing			(  )Weakness  (  )Diarrhea     (  )Palpitations	(  )Anorexia			(  )Myalgia    All other review of systems negative: Yes      PHYSICAL EXAM:    Constitutional: Appears Well    Neurological: A& O x 3    Skin: Warm    Respiratory and Thorax: normal effort; Breath sounds: normal; No rales/wheezing/rhonchi  	  Cardiovascular: S1, S2, regular, NMBR	    Gastrointestinal: BS + x 4Q, nontender	    Genitourinary:  Bladder nondistended, nontender    Musculoskeletal:   General Right:   no muscle/joint tenderness,   normal tone, no joint swelling,   ROM: limited/full	    General Left:   no muscle/joint tenderness,   normal tone, no joint swelling,   ROM: limited/full    Knee:  Right: Incision: ; Dressing CDI;   Lower extrems:   Right: no calf tenderness              negative shila's sign               + pedal pulses    Left:   no calf tenderness              negative shila's sign               + pedal pulses                          9.2    10.4  )-----------( 192      ( 08 Mar 2017 06:01 )             28.2       08 Mar 2017 06:01    140    |  108    |  22     ----------------------------<  142    4.1     |  25     |  0.78     Ca    8.9        08 Mar 2017 06:01        PT/INR - ( 07 Mar 2017 12:55 )   PT: 11.7 sec;   INR: 1.06 ratio         				    MEDICATIONS  (STANDING):  acetaminophen   Tablet. 650milliGRAM(s) Oral every 6 hours  celecoxib 200milliGRAM(s) Oral with breakfast  senna 2Tablet(s) Oral at bedtime  oxyCODONE ER Tablet 10milliGRAM(s) Oral once  tranexamic acid IVPB 662milliGRAM(s) IV Intermittent every 1 hour  sodium chloride 0.9% lock flush 3milliLiter(s) IV Push every 8 hours  morphine  - Injectable 4milliGRAM(s) IV Push once  polyethylene glycol 3350 17Gram(s) Oral daily  docusate sodium 100milliGRAM(s) Oral three times a day  ferrous    sulfate 325milliGRAM(s) Oral three times a day with meals  folic acid 1milliGRAM(s) Oral daily  multivitamin 1Tablet(s) Oral daily  ascorbic acid 500milliGRAM(s) Oral two times a day  lisinopril 10milliGRAM(s) Oral daily  metoprolol succinate ER 25milliGRAM(s) Oral daily  levothyroxine 112MICROGram(s) Oral daily  lactated ringers. 1000milliLiter(s) IV Continuous <Continuous>  oxyCODONE ER Tablet 10milliGRAM(s) Oral every 12 hours  warfarin 5milliGRAM(s) Oral daily  heparin  Injectable 5000Unit(s) SubCutaneous every 8 hours          DVT Prophylaxis:  LMWH                   (  )  Heparin SQ           ( x )  Coumadin             (x  )  Xarelto                  (  )  Eliquis                   (  )  Venodynes           (  )  Ambulation          (x  )  UFH                       (  )  Contraindicated  (  )

## 2017-03-08 NOTE — PROGRESS NOTE ADULT - SUBJECTIVE AND OBJECTIVE BOX
Pt S&E. Pain controlled. Resting comfortably.     Vital Signs Last 24 Hrs  T(C): 36.5, Max: 36.9 (03-07 @ 14:00)  T(F): 97.7, Max: 98.4 (03-07 @ 14:00)  HR: 67 (55 - 67)  BP: 117/54 (102/44 - 128/78)  BP(mean): --  RR: 18 (11 - 20)  SpO2: 98% (98% - 100%)    Gen: NAD, AAOx3  RLE:  Dressing clean dry intact  +EHL/FHL/TA/GS  SILT L3-S1  +DP/PT Pulses  Compartments soft  No calf TTP B/L

## 2017-03-08 NOTE — CONSULT NOTE ADULT - ASSESSMENT
This is a 82 year old female s/p right tkr on 3-7-17 with hig thrombosis risk requires anticoagulation prophylaxis.   Plan:  Coumadin 5 mg PO daily starting: today 3-8-17   x 4 weeks total adjust dose per INR  Heparin 5,000 units SQ Q8hour  Daily PT/INR  Daily CBC/BMP  Enc ambulation  Venodynes  thanks for consult will f/u

## 2017-03-08 NOTE — PROGRESS NOTE ADULT - ASSESSMENT
81yo F s/p R TKA POD# 1  -pain control  -Drain DC'd in PACU after cell saver was DC'd  -no blood to reinfuse from cell saver  -DVT PPx  -WBAT  -PT  -FU Labs  -DC Planning

## 2017-03-08 NOTE — PROGRESS NOTE ADULT - SUBJECTIVE AND OBJECTIVE BOX
83 yo WF with PMH HTN, Hypothyroidism, Cataracts, prior LT THR presented for elective RT TKR    3.8: pod#1, c/o left knee pain, no n/v/d      REVIEW OF SYSTEMS:    CONSTITUTIONAL: No weakness, No fevers or chills  ENT: No ear ache, No sorethroat  NECK: No pain, No stiffness  RESPIRATORY: No cough, No wheezing, No hemoptysis; No dyspnea  CARDIOVASCULAR: No chest pain, No palpitations  GASTROINTESTINAL: No abd pain, No nausea, No vomiting, No hematemesis, No diarrhea or constipation. No melena, No hematochezia.  GENITOURINARY: No dysuria, No  hematuria  NEUROLOGICAL: No diplopia, No paresthesia, No motor dysfunction  SKIN: No rashes, or lesions   PSYCH: no anxiety, no suicidal ideation    All other review of systems is negative unless indicated above    Vital Signs Last 24 Hrs  T(C): 36.3, Max: 36.5 (03-08 @ 04:02)  T(F): 97.3, Max: 97.7 (03-08 @ 04:02)  HR: 58 (56 - 67)  BP: 110/52 (104/49 - 122/57)  BP(mean): --  RR: 16 (16 - 20)  SpO2: 98% (98% - 100%)    PHYSICAL EXAM:    GENERAL: NAD, Well nourished  HEENT:  NC/AT, EOMI, PERRLA, No scleral icterus, Moist mucous membranes  NECK: Supple, No JVD  CNS:  Alert & Oriented X3, Motor Strength 5/5 B/L upper and lower extremities; DTRs 2+ intact   LUNG: Normal Breath sounds, Clear to auscultation bilaterally, No rales, No rhonchi, No wheezing  HEART: RRR; No murmurs, No rubs  ABDOMEN: +BS, ST/ND/NT  GENITOURINARY- Voiding, Bladder not distended  EXTREMITIES:  2+ Peripheral Pulses, No clubbing, No cyanosis, No tibial edema  MUSCULOSKELTAL: Left knee surgical dressing w/o hemorrhage  VAGINAL: deferred  RECTAL: deferred, not indicated  BREAST: deferred                          9.2    10.4  )-----------( 192      ( 08 Mar 2017 06:01 )             28.2     08 Mar 2017 06:01    140    |  108    |  22     ----------------------------<  142    4.1     |  25     |  0.78     Ca    8.9        08 Mar 2017 06:01      MEDICATIONS  (STANDING):  acetaminophen   Tablet. 650milliGRAM(s) Oral every 6 hours  celecoxib 200milliGRAM(s) Oral with breakfast  senna 2Tablet(s) Oral at bedtime  oxyCODONE ER Tablet 10milliGRAM(s) Oral once  tranexamic acid IVPB 662milliGRAM(s) IV Intermittent every 1 hour  sodium chloride 0.9% lock flush 3milliLiter(s) IV Push every 8 hours  morphine  - Injectable 4milliGRAM(s) IV Push once  polyethylene glycol 3350 17Gram(s) Oral daily  docusate sodium 100milliGRAM(s) Oral three times a day  ferrous    sulfate 325milliGRAM(s) Oral three times a day with meals  folic acid 1milliGRAM(s) Oral daily  multivitamin 1Tablet(s) Oral daily  ascorbic acid 500milliGRAM(s) Oral two times a day  lisinopril 10milliGRAM(s) Oral daily  metoprolol succinate ER 25milliGRAM(s) Oral daily  levothyroxine 112MICROGram(s) Oral daily  lactated ringers. 1000milliLiter(s) IV Continuous <Continuous>  oxyCODONE ER Tablet 10milliGRAM(s) Oral every 12 hours  warfarin 5milliGRAM(s) Oral daily  heparin  Injectable 5000Unit(s) SubCutaneous every 8 hours    A/P:    1. Left Tkr: pod#1  cw analgesia with opiates prn  DVT prophy: Coumadin loading, Heparin Sq bridging  Incentive spirometry  PT evaluation  routine labs in Am    2. Htn:   cw Lopressor/Lisinopril    3. Hypothyroidism:   cw Synthroid 83 yo WF with PMH HTN, Hypothyroidism, Cataracts, prior LT THR presented for elective RT TKR    3.8: pod#1, c/o right knee pain, no n/v/d      REVIEW OF SYSTEMS:    CONSTITUTIONAL: No weakness, No fevers or chills  ENT: No ear ache, No sorethroat  NECK: No pain, No stiffness  RESPIRATORY: No cough, No wheezing, No hemoptysis; No dyspnea  CARDIOVASCULAR: No chest pain, No palpitations  GASTROINTESTINAL: No abd pain, No nausea, No vomiting, No hematemesis, No diarrhea or constipation. No melena, No hematochezia.  GENITOURINARY: No dysuria, No  hematuria  NEUROLOGICAL: No diplopia, No paresthesia, No motor dysfunction  SKIN: No rashes, or lesions   PSYCH: no anxiety, no suicidal ideation    All other review of systems is negative unless indicated above    Vital Signs Last 24 Hrs  T(C): 36.3, Max: 36.5 (03-08 @ 04:02)  T(F): 97.3, Max: 97.7 (03-08 @ 04:02)  HR: 58 (56 - 67)  BP: 110/52 (104/49 - 122/57)  BP(mean): --  RR: 16 (16 - 20)  SpO2: 98% (98% - 100%)    PHYSICAL EXAM:    GENERAL: NAD, Well nourished  HEENT:  NC/AT, EOMI, PERRLA, No scleral icterus, Moist mucous membranes  NECK: Supple, No JVD  CNS:  Alert & Oriented X3, Motor Strength 5/5 B/L upper and lower extremities; DTRs 2+ intact   LUNG: Normal Breath sounds, Clear to auscultation bilaterally, No rales, No rhonchi, No wheezing  HEART: RRR; No murmurs, No rubs  ABDOMEN: +BS, ST/ND/NT  GENITOURINARY- Voiding, Bladder not distended  EXTREMITIES:  2+ Peripheral Pulses, No clubbing, No cyanosis, No tibial edema  MUSCULOSKELTAL: right knee surgical dressing w/o hemorrhage  VAGINAL: deferred  RECTAL: deferred, not indicated  BREAST: deferred                          9.2    10.4  )-----------( 192      ( 08 Mar 2017 06:01 )             28.2     08 Mar 2017 06:01    140    |  108    |  22     ----------------------------<  142    4.1     |  25     |  0.78     Ca    8.9        08 Mar 2017 06:01      MEDICATIONS  (STANDING):  acetaminophen   Tablet. 650milliGRAM(s) Oral every 6 hours  celecoxib 200milliGRAM(s) Oral with breakfast  senna 2Tablet(s) Oral at bedtime  oxyCODONE ER Tablet 10milliGRAM(s) Oral once  tranexamic acid IVPB 662milliGRAM(s) IV Intermittent every 1 hour  sodium chloride 0.9% lock flush 3milliLiter(s) IV Push every 8 hours  morphine  - Injectable 4milliGRAM(s) IV Push once  polyethylene glycol 3350 17Gram(s) Oral daily  docusate sodium 100milliGRAM(s) Oral three times a day  ferrous    sulfate 325milliGRAM(s) Oral three times a day with meals  folic acid 1milliGRAM(s) Oral daily  multivitamin 1Tablet(s) Oral daily  ascorbic acid 500milliGRAM(s) Oral two times a day  lisinopril 10milliGRAM(s) Oral daily  metoprolol succinate ER 25milliGRAM(s) Oral daily  levothyroxine 112MICROGram(s) Oral daily  lactated ringers. 1000milliLiter(s) IV Continuous <Continuous>  oxyCODONE ER Tablet 10milliGRAM(s) Oral every 12 hours  warfarin 5milliGRAM(s) Oral daily  heparin  Injectable 5000Unit(s) SubCutaneous every 8 hours    A/P:    1. Right Tkr: pod#1  cw analgesia with opiates prn  DVT prophy: Coumadin loading, Heparin Sq bridging  Incentive spirometry  PT evaluation  routine labs in Am    2. Htn:   cw Lopressor/Lisinopril    3. Hypothyroidism:   cw Synthroid

## 2017-03-09 VITALS
RESPIRATION RATE: 16 BRPM | OXYGEN SATURATION: 99 % | TEMPERATURE: 98 F | HEART RATE: 69 BPM | SYSTOLIC BLOOD PRESSURE: 131 MMHG | DIASTOLIC BLOOD PRESSURE: 69 MMHG

## 2017-03-09 LAB
ANION GAP SERPL CALC-SCNC: 9 MMOL/L — SIGNIFICANT CHANGE UP (ref 5–17)
BUN SERPL-MCNC: 21 MG/DL — SIGNIFICANT CHANGE UP (ref 7–23)
CALCIUM SERPL-MCNC: 8.8 MG/DL — SIGNIFICANT CHANGE UP (ref 8.5–10.1)
CHLORIDE SERPL-SCNC: 107 MMOL/L — SIGNIFICANT CHANGE UP (ref 96–108)
CO2 SERPL-SCNC: 26 MMOL/L — SIGNIFICANT CHANGE UP (ref 22–31)
CREAT SERPL-MCNC: 0.69 MG/DL — SIGNIFICANT CHANGE UP (ref 0.5–1.3)
GLUCOSE SERPL-MCNC: 103 MG/DL — HIGH (ref 70–99)
HCT VFR BLD CALC: 26.5 % — LOW (ref 34.5–45)
HGB BLD-MCNC: 9 G/DL — LOW (ref 11.5–15.5)
INR BLD: 1.11 RATIO — SIGNIFICANT CHANGE UP (ref 0.88–1.16)
MCHC RBC-ENTMCNC: 30.8 PG — SIGNIFICANT CHANGE UP (ref 27–34)
MCHC RBC-ENTMCNC: 34 GM/DL — SIGNIFICANT CHANGE UP (ref 32–36)
MCV RBC AUTO: 90.3 FL — SIGNIFICANT CHANGE UP (ref 80–100)
PLATELET # BLD AUTO: 176 K/UL — SIGNIFICANT CHANGE UP (ref 150–400)
POTASSIUM SERPL-MCNC: 4.4 MMOL/L — SIGNIFICANT CHANGE UP (ref 3.5–5.3)
POTASSIUM SERPL-SCNC: 4.4 MMOL/L — SIGNIFICANT CHANGE UP (ref 3.5–5.3)
PROTHROM AB SERPL-ACNC: 12.2 SEC — SIGNIFICANT CHANGE UP (ref 10–13.1)
RBC # BLD: 2.93 M/UL — LOW (ref 3.8–5.2)
RBC # FLD: 12.2 % — SIGNIFICANT CHANGE UP (ref 10.3–14.5)
SODIUM SERPL-SCNC: 142 MMOL/L — SIGNIFICANT CHANGE UP (ref 135–145)
WBC # BLD: 6.6 K/UL — SIGNIFICANT CHANGE UP (ref 3.8–10.5)
WBC # FLD AUTO: 6.6 K/UL — SIGNIFICANT CHANGE UP (ref 3.8–10.5)

## 2017-03-09 PROCEDURE — 99223 1ST HOSP IP/OBS HIGH 75: CPT

## 2017-03-09 RX ORDER — PANTOPRAZOLE SODIUM 20 MG/1
1 TABLET, DELAYED RELEASE ORAL
Qty: 30 | Refills: 0 | OUTPATIENT
Start: 2017-03-09 | End: 2017-04-08

## 2017-03-09 RX ORDER — WARFARIN SODIUM 2.5 MG/1
1 TABLET ORAL
Qty: 0 | Refills: 0 | COMMUNITY
Start: 2017-03-09

## 2017-03-09 RX ORDER — WARFARIN SODIUM 2.5 MG/1
1 TABLET ORAL
Qty: 30 | Refills: 1 | OUTPATIENT
Start: 2017-03-09 | End: 2017-05-07

## 2017-03-09 RX ORDER — ENOXAPARIN SODIUM 100 MG/ML
0.3 INJECTION SUBCUTANEOUS
Qty: 3 | Refills: 1 | OUTPATIENT
Start: 2017-03-09 | End: 2017-03-18

## 2017-03-09 RX ADMIN — Medication 325 MILLIGRAM(S): at 11:17

## 2017-03-09 RX ADMIN — Medication 25 MILLIGRAM(S): at 06:02

## 2017-03-09 RX ADMIN — SODIUM CHLORIDE 3 MILLILITER(S): 9 INJECTION INTRAMUSCULAR; INTRAVENOUS; SUBCUTANEOUS at 05:04

## 2017-03-09 RX ADMIN — OXYCODONE HYDROCHLORIDE 5 MILLIGRAM(S): 5 TABLET ORAL at 07:16

## 2017-03-09 RX ADMIN — Medication 500 MILLIGRAM(S): at 06:02

## 2017-03-09 RX ADMIN — Medication 1 TABLET(S): at 11:17

## 2017-03-09 RX ADMIN — OXYCODONE HYDROCHLORIDE 5 MILLIGRAM(S): 5 TABLET ORAL at 01:05

## 2017-03-09 RX ADMIN — OXYCODONE HYDROCHLORIDE 5 MILLIGRAM(S): 5 TABLET ORAL at 06:10

## 2017-03-09 RX ADMIN — Medication 112 MICROGRAM(S): at 05:01

## 2017-03-09 RX ADMIN — Medication 650 MILLIGRAM(S): at 06:08

## 2017-03-09 RX ADMIN — OXYCODONE HYDROCHLORIDE 5 MILLIGRAM(S): 5 TABLET ORAL at 02:00

## 2017-03-09 RX ADMIN — HEPARIN SODIUM 5000 UNIT(S): 5000 INJECTION INTRAVENOUS; SUBCUTANEOUS at 06:00

## 2017-03-09 RX ADMIN — Medication 1 MILLIGRAM(S): at 11:17

## 2017-03-09 RX ADMIN — LISINOPRIL 10 MILLIGRAM(S): 2.5 TABLET ORAL at 06:02

## 2017-03-09 RX ADMIN — CODEINE SULFATE 15 MILLIGRAM(S): 60 TABLET ORAL at 04:13

## 2017-03-09 RX ADMIN — Medication 650 MILLIGRAM(S): at 06:01

## 2017-03-09 RX ADMIN — Medication 650 MILLIGRAM(S): at 11:21

## 2017-03-09 RX ADMIN — OXYCODONE HYDROCHLORIDE 5 MILLIGRAM(S): 5 TABLET ORAL at 09:38

## 2017-03-09 RX ADMIN — Medication 100 MILLIGRAM(S): at 06:02

## 2017-03-09 RX ADMIN — POLYETHYLENE GLYCOL 3350 17 GRAM(S): 17 POWDER, FOR SOLUTION ORAL at 11:17

## 2017-03-09 RX ADMIN — CELECOXIB 200 MILLIGRAM(S): 200 CAPSULE ORAL at 11:17

## 2017-03-09 RX ADMIN — CODEINE SULFATE 15 MILLIGRAM(S): 60 TABLET ORAL at 05:03

## 2017-03-09 NOTE — PROGRESS NOTE ADULT - ASSESSMENT
This is a 82 year old female s/p right tkr on 3-7-17 with hig thrombosis risk requires anticoagulation prophylaxis.     3/9: Prescriptions for warfarin and Lovenox sent to [facility] today. Discussed with patient, the necessity to remain on Lovenox twice daily until she is notified by Anticoagulation that her INR is above 2 and can stop. Lab to draw INR tomorrow, 3/10 then routinely Mon/Thurs next week. Will followup via phone with patient.    Plan:  Coumadin 5 mg PO daily starting: today 3-8-17   x 4 weeks total adjust dose per INR  Heparin 5,000 units SQ Q8hour  Daily PT/INR  Daily CBC/BMP  Enc ambulation  Venodynes

## 2017-03-09 NOTE — PROGRESS NOTE ADULT - SUBJECTIVE AND OBJECTIVE BOX
Pt S&E. Pain controlled. No acute events overnight    Vital Signs Last 24 Hrs  T(C): 36.4, Max: 36.7 (03-08 @ 23:21)  T(F): 97.6, Max: 98 (03-08 @ 23:21)  HR: 69 (58 - 71)  BP: 131/69 (107/43 - 131/69)  BP(mean): 64 (64 - 64)  RR: 16 (16 - 16)  SpO2: 99% (97% - 100%)    Gen: NAD  RLE:  Dsg c/d/i  SILT L2-S1  +EHL/FHL/TA/Gastroc  DP+  Soft compartments  No calf ttp

## 2017-03-09 NOTE — PROGRESS NOTE ADULT - SUBJECTIVE AND OBJECTIVE BOX
HPI:      Patient is a 82y old  Female who presents with a chief complaint of "My right knee hurts when I walk.' (07 Mar 2017 20:15)  pt is s/p right tkr on 3-8-17.    Consulted by Dr. Giang    for VTE prophylaxis, risk stratification, and anticoagulation management.    PAST MEDICAL & SURGICAL HISTORY:  Osteoarthritis: right knee  LBBB (left bundle branch block)  Hypothyroidism  Hyperlipidemia  H/O breast biopsy: 15 yrs ago  History of shoulder surgery: right shoulder open surgery - 8 yrs ago  H/O: knee surgery: right knee meniscectomy - 5 yrs ago  left knee surgery - 40 yrs ago  H/O hernia repair: 2x   10 yrs ago, 5 yrs ago  H/O partial thyroidectomy: 1964  History of hip replacement, total, left: 2009  H/O cataract extraction: b/l 2009  S/P coronary angiogram: 02/2017 done at [facility]  Pt has MYNX vascular closure device    Caprini VTE Risk Score:8    IMPROVE Bleeding Risk Score1.5 low  crcl: 59.12  bmi:20.9  Falls Risk:   High (  )  Mod (x  )  Low (  )    EBL:  150 ml      3-8-17  Pt seen at bedside discussed her anticoagulation with coumadin and overlapping with heparin sq.  Literature provided, questions answered. will reinforce as needed.   3/9/2017: Patient seen at bedside, to be LA'ed home today. Discussed INR 1.11 today, must go home with lovenox injections. Provided with education material and discharge instructions. Lab to draw INR at home tomorrow, then routine Mon/Thurs. Must cont. Lovenox 30mg BID until INR 2.0 or > Patient verbalizes understanding.      FAMILY HISTORY:  Family history of ischemic heart disease (IHD) (Grandparent)  Family history of colon cancer in mother (Mother)    Denies any personal or familial history of clotting or bleeding disorders.    Allergies    Duricef (Other)  epinephrine (Other)  oxycodone (Vomiting)    Intolerances        REVIEW OF SYSTEMS    (  )Fever	     (  )Constipation	(  )SOB				(  )Headache	(  )Dysuria  (  )Chills	     (  )Melena	(  )Dyspnea present on exertion	                    (  )Dizziness                    (  )Polyuria  (  )Nausea	     (  )Hematochezia	(  )Cough			                    (  )Syncope   	(  )Hematuria  (  )Vomiting    (  )Chest Pain	(  )Wheezing			(  )Weakness  (  )Diarrhea     (  )Palpitations	(  )Anorexia			(  )Myalgia    All other review of systems negative: Yes      PHYSICAL EXAM:    Constitutional: Appears Well    Neurological: A& O x 3    Skin: Warm    Respiratory and Thorax: normal effort; Breath sounds: normal; No rales/wheezing/rhonchi  	  Cardiovascular: S1, S2, regular, NMBR	    Gastrointestinal: BS + x 4Q, nontender	    Genitourinary:  Bladder nondistended, nontender    Musculoskeletal:   General Right:   no muscle/joint tenderness,   normal tone, no joint swelling,   ROM: limited/full	    General Left:   no muscle/joint tenderness,   normal tone, no joint swelling,   ROM: limited/full    Knee:  Right: Incision: ; Dressing CDI;   Lower extrems:   Right: no calf tenderness              negative shila's sign               + pedal pulses    Left:   no calf tenderness              negative shila's sign               + pedal pulses                                         9.0    6.6   )-----------( 176      ( 09 Mar 2017 06:13 )             26.5       09 Mar 2017 06:13    142    |  107    |  21     ----------------------------<  103    4.4     |  26     |  0.69     Ca    8.8        09 Mar 2017 06:13        PT/INR - ( 09 Mar 2017 06:13 )   PT: 12.2 sec;   INR: 1.11 ratio         PT/INR - ( 07 Mar 2017 12:55 )   PT: 11.7 sec;   INR: 1.06 ratio         				    MEDICATIONS  (STANDING):  acetaminophen   Tablet. 650milliGRAM(s) Oral every 6 hours  celecoxib 200milliGRAM(s) Oral with breakfast  senna 2Tablet(s) Oral at bedtime  oxyCODONE ER Tablet 10milliGRAM(s) Oral once  tranexamic acid IVPB 662milliGRAM(s) IV Intermittent every 1 hour  sodium chloride 0.9% lock flush 3milliLiter(s) IV Push every 8 hours  morphine  - Injectable 4milliGRAM(s) IV Push once  polyethylene glycol 3350 17Gram(s) Oral daily  docusate sodium 100milliGRAM(s) Oral three times a day  ferrous    sulfate 325milliGRAM(s) Oral three times a day with meals  folic acid 1milliGRAM(s) Oral daily  multivitamin 1Tablet(s) Oral daily  ascorbic acid 500milliGRAM(s) Oral two times a day  lisinopril 10milliGRAM(s) Oral daily  metoprolol succinate ER 25milliGRAM(s) Oral daily  levothyroxine 112MICROGram(s) Oral daily  lactated ringers. 1000milliLiter(s) IV Continuous <Continuous>  oxyCODONE ER Tablet 10milliGRAM(s) Oral every 12 hours  warfarin 5milliGRAM(s) Oral daily  heparin  Injectable 5000Unit(s) SubCutaneous every 8 hours          DVT Prophylaxis:  LMWH                   (  )  Heparin SQ           ( x )  Coumadin             (x  )  Xarelto                  (  )  Eliquis                   (  )  Venodynes           (  )  Ambulation          (x  )  UFH                       (  )  Contraindicated  (  )

## 2017-03-10 ENCOUNTER — LABORATORY RESULT (OUTPATIENT)
Age: 82
End: 2017-03-10

## 2017-03-13 ENCOUNTER — LABORATORY RESULT (OUTPATIENT)
Age: 82
End: 2017-03-13

## 2017-03-13 DIAGNOSIS — M17.11 UNILATERAL PRIMARY OSTEOARTHRITIS, RIGHT KNEE: ICD-10-CM

## 2017-03-13 DIAGNOSIS — I44.7 LEFT BUNDLE-BRANCH BLOCK, UNSPECIFIED: ICD-10-CM

## 2017-03-13 DIAGNOSIS — Z79.82 LONG TERM (CURRENT) USE OF ASPIRIN: ICD-10-CM

## 2017-03-13 DIAGNOSIS — Z96.642 PRESENCE OF LEFT ARTIFICIAL HIP JOINT: ICD-10-CM

## 2017-03-13 DIAGNOSIS — E03.9 HYPOTHYROIDISM, UNSPECIFIED: ICD-10-CM

## 2017-03-13 DIAGNOSIS — E78.5 HYPERLIPIDEMIA, UNSPECIFIED: ICD-10-CM

## 2017-03-16 ENCOUNTER — LABORATORY RESULT (OUTPATIENT)
Age: 82
End: 2017-03-16

## 2017-03-20 ENCOUNTER — LABORATORY RESULT (OUTPATIENT)
Age: 82
End: 2017-03-20

## 2017-03-23 ENCOUNTER — LABORATORY RESULT (OUTPATIENT)
Age: 82
End: 2017-03-23

## 2017-03-27 ENCOUNTER — LABORATORY RESULT (OUTPATIENT)
Age: 82
End: 2017-03-27

## 2017-03-30 ENCOUNTER — LABORATORY RESULT (OUTPATIENT)
Age: 82
End: 2017-03-30

## 2017-06-05 ENCOUNTER — OTHER (OUTPATIENT)
Age: 82
End: 2017-06-05

## 2017-06-19 ENCOUNTER — APPOINTMENT (OUTPATIENT)
Dept: OBGYN | Facility: CLINIC | Age: 82
End: 2017-06-19

## 2017-12-27 ENCOUNTER — APPOINTMENT (OUTPATIENT)
Dept: OBGYN | Facility: CLINIC | Age: 82
End: 2017-12-27
Payer: MEDICARE

## 2017-12-27 VITALS
SYSTOLIC BLOOD PRESSURE: 140 MMHG | WEIGHT: 148 LBS | DIASTOLIC BLOOD PRESSURE: 70 MMHG | BODY MASS INDEX: 21.19 KG/M2 | HEIGHT: 70 IN

## 2017-12-27 PROCEDURE — 99214 OFFICE O/P EST MOD 30 MIN: CPT

## 2017-12-27 RX ORDER — LEVOTHYROXINE SODIUM 0.11 MG/1
112 TABLET ORAL
Qty: 90 | Refills: 0 | Status: ACTIVE | COMMUNITY
Start: 2017-04-25

## 2017-12-27 RX ORDER — IPRATROPIUM BROMIDE 21 UG/1
0.03 SPRAY NASAL
Qty: 30 | Refills: 0 | Status: ACTIVE | COMMUNITY
Start: 2017-07-06

## 2017-12-27 RX ORDER — AMOXICILLIN 500 MG/1
500 CAPSULE ORAL
Qty: 16 | Refills: 0 | Status: COMPLETED | COMMUNITY
Start: 2017-12-16

## 2018-06-04 ENCOUNTER — RX RENEWAL (OUTPATIENT)
Age: 83
End: 2018-06-04

## 2018-12-14 PROBLEM — E78.5 HYPERLIPIDEMIA, UNSPECIFIED: Chronic | Status: ACTIVE | Noted: 2017-02-22

## 2018-12-14 PROBLEM — E03.9 HYPOTHYROIDISM, UNSPECIFIED: Chronic | Status: ACTIVE | Noted: 2017-02-22

## 2018-12-14 PROBLEM — I44.7 LEFT BUNDLE-BRANCH BLOCK, UNSPECIFIED: Chronic | Status: ACTIVE | Noted: 2017-02-22

## 2018-12-14 PROBLEM — M19.90 UNSPECIFIED OSTEOARTHRITIS, UNSPECIFIED SITE: Chronic | Status: ACTIVE | Noted: 2017-02-22

## 2019-01-24 ENCOUNTER — APPOINTMENT (OUTPATIENT)
Dept: OBGYN | Facility: CLINIC | Age: 84
End: 2019-01-24
Payer: MEDICARE

## 2019-01-24 VITALS
HEIGHT: 70 IN | SYSTOLIC BLOOD PRESSURE: 130 MMHG | WEIGHT: 142 LBS | DIASTOLIC BLOOD PRESSURE: 70 MMHG | BODY MASS INDEX: 20.33 KG/M2

## 2019-01-24 PROCEDURE — G0101: CPT

## 2019-01-24 RX ORDER — ALENDRONATE SODIUM 70 MG/1
70 TABLET ORAL
Qty: 12 | Refills: 1 | Status: DISCONTINUED | COMMUNITY
Start: 2017-06-19 | End: 2019-01-24

## 2019-01-24 NOTE — HISTORY OF PRESENT ILLNESS
[1 Year Ago] : 1 year ago [Good] : being in good health [Healthy Diet] : a healthy diet [Regular Exercise] : regular exercise [Last Mammogram ___] : Last Mammogram was [unfilled] [Last Bone Density ___] : Last bone density studies [unfilled] [Postmenopausal] : is postmenopausal

## 2019-01-24 NOTE — CHIEF COMPLAINT
[Annual Visit] : annual visit [FreeTextEntry1] :  dx with pancreatic cancer. He had chemo and will be doing radiation. pt with hx of osteoporosis\par Pt couldn’t tolerate fosamax, seeing endocrine for high calcium level. Due for bmd now.\par No vb, no other complaints today.

## 2019-01-24 NOTE — PHYSICAL EXAM
[Awake] : awake [Alert] : alert [Acute Distress] : no acute distress [LAD] : no lymphadenopathy [Thyroid Nodule] : no thyroid nodule [Goiter] : no goiter [Mass] : no breast mass [Nipple Discharge] : no nipple discharge [Axillary LAD] : no axillary lymphadenopathy [Soft] : soft [Tender] : non tender [Oriented x3] : oriented to person, place, and time [Vulvar Atrophy] : vulvar atrophy [Normal] : uterus [Atrophy] : atrophy [No Bleeding] : there was no active vaginal bleeding [Pap Obtained] : a Pap smear was not performed [Normal Position] : in a normal position [Tenderness] : nontender [Enlarged ___ wks] : not enlarged [Mass ___ cm] : no uterine mass was palpated [Uterine Adnexae] : were not tender and not enlarged [Adnexa Tenderness] : were not tender [Ovarian Mass (___ Cm)] : there were no adnexal masses [No Tenderness] : no rectal tenderness [Occult Blood] : occult blood test from digital rectal exam was negative [RRR, No Murmurs] : RRR, no murmurs [CTAB] : CTAB

## 2019-06-18 ENCOUNTER — APPOINTMENT (OUTPATIENT)
Dept: OBGYN | Facility: CLINIC | Age: 84
End: 2019-06-18

## 2019-07-15 ENCOUNTER — APPOINTMENT (OUTPATIENT)
Dept: OBGYN | Facility: CLINIC | Age: 84
End: 2019-07-15
Payer: MEDICARE

## 2019-07-15 PROCEDURE — 99213 OFFICE O/P EST LOW 20 MIN: CPT

## 2019-07-15 NOTE — CHIEF COMPLAINT
[FreeTextEntry1] : Pt here to discuss BMD result, however no report here at the moment. Pt states it improved from previous in 2017 which showed osteoporosis. Has been seeing endocrine (Dr. Maurer) for elevated calcium, did 24 hr urine. Parathyroid checked. pt states everything was fine but they told her to stop taking calium and she is uncomfortable with that.\par takes 1200mg calcium and centrum silver\par Gardens, active

## 2020-06-09 ENCOUNTER — APPOINTMENT (OUTPATIENT)
Dept: OBGYN | Facility: CLINIC | Age: 85
End: 2020-06-09
Payer: MEDICARE

## 2020-06-09 VITALS
HEIGHT: 70 IN | WEIGHT: 140 LBS | SYSTOLIC BLOOD PRESSURE: 130 MMHG | TEMPERATURE: 98.2 F | DIASTOLIC BLOOD PRESSURE: 62 MMHG | BODY MASS INDEX: 20.04 KG/M2

## 2020-06-09 DIAGNOSIS — N39.3 STRESS INCONTINENCE (FEMALE) (MALE): ICD-10-CM

## 2020-06-09 PROCEDURE — 99213 OFFICE O/P EST LOW 20 MIN: CPT

## 2020-06-09 NOTE — PHYSICAL EXAM
[Awake] : awake [Alert] : alert [Acute Distress] : no acute distress [LAD] : no lymphadenopathy [Thyroid Nodule] : no thyroid nodule [Goiter] : no goiter [Mass] : no breast mass [Nipple Discharge] : no nipple discharge [Axillary LAD] : no axillary lymphadenopathy [Soft] : soft [Tender] : non tender [Oriented x3] : oriented to person, place, and time [Vulvar Atrophy] : vulvar atrophy [Normal] : uterus [Atrophy] : atrophy [No Bleeding] : there was no active vaginal bleeding [Pap Obtained] : a Pap smear was not performed [Normal Position] : in a normal position [Tenderness] : nontender [Enlarged ___ wks] : not enlarged [Mass ___ cm] : no uterine mass was palpated [Uterine Adnexae] : were not tender and not enlarged [Ovarian Mass (___ Cm)] : there were no adnexal masses [Adnexa Tenderness] : were not tender [No Tenderness] : no rectal tenderness [Occult Blood] : occult blood test from digital rectal exam was negative [RRR, No Murmurs] : RRR, no murmurs [CTAB] : CTAB

## 2020-06-09 NOTE — HISTORY OF PRESENT ILLNESS
[1 Year Ago] : 1 year ago [Last Mammogram ___] : Last Mammogram was [unfilled] [Last Bone Density ___] : Last bone density studies [unfilled] [Postmenopausal] : is postmenopausal

## 2021-06-10 ENCOUNTER — APPOINTMENT (OUTPATIENT)
Dept: OBGYN | Facility: CLINIC | Age: 86
End: 2021-06-10
Payer: MEDICARE

## 2021-06-10 VITALS
HEIGHT: 70 IN | WEIGHT: 142 LBS | RESPIRATION RATE: 16 BRPM | BODY MASS INDEX: 20.33 KG/M2 | SYSTOLIC BLOOD PRESSURE: 144 MMHG | DIASTOLIC BLOOD PRESSURE: 68 MMHG

## 2021-06-10 DIAGNOSIS — M85.80 OTHER SPECIFIED DISORDERS OF BONE DENSITY AND STRUCTURE, UNSPECIFIED SITE: ICD-10-CM

## 2021-06-10 DIAGNOSIS — Z01.419 ENCOUNTER FOR GYNECOLOGICAL EXAMINATION (GENERAL) (ROUTINE) W/OUT ABNORMAL FINDINGS: ICD-10-CM

## 2021-06-10 PROCEDURE — G0101: CPT

## 2021-06-10 NOTE — PHYSICAL EXAM
[Appropriately responsive] : appropriately responsive [Alert] : alert [No Acute Distress] : no acute distress [Soft] : soft [Non-distended] : non-distended [Non-tender] : non-tender [No HSM] : No HSM [No Lesions] : no lesions [No Mass] : no mass [Oriented x3] : oriented x3 [Examination Of The Breasts] : a normal appearance [No Masses] : no breast masses were palpable [Vulvar Atrophy] : vulvar atrophy [Labia Majora] : normal [Labia Minora] : normal [Atrophy] : atrophy [Normal] : normal [Tenderness] : nontender [Enlarged ___ wks] : not enlarged [FreeTextEntry9] : guaiac- [Uterine Adnexae] : non-palpable

## 2021-06-10 NOTE — HISTORY OF PRESENT ILLNESS
[Previously active] : previously active [TextBox_4] : Vaccinated in March for COVID.\par No vb, takes vit d, exercises.\par Due for BMD [Mammogramdate] : 8/2020 [BoneDensityDate] : 2019

## 2021-08-12 DIAGNOSIS — R92.2 INCONCLUSIVE MAMMOGRAM: ICD-10-CM

## 2021-08-26 ENCOUNTER — NON-APPOINTMENT (OUTPATIENT)
Age: 86
End: 2021-08-26

## 2021-08-30 ENCOUNTER — NON-APPOINTMENT (OUTPATIENT)
Age: 86
End: 2021-08-30

## 2021-08-31 ENCOUNTER — APPOINTMENT (OUTPATIENT)
Dept: OBGYN | Facility: CLINIC | Age: 86
End: 2021-08-31
Payer: MEDICARE

## 2021-08-31 PROCEDURE — 99213 OFFICE O/P EST LOW 20 MIN: CPT
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
## 2021-08-31 NOTE — DISCUSSION/SUMMARY
[FreeTextEntry1] : Will check calcium and vit d levels and titrate vitd accordingly. Pt will go home and see exactly how much she takes. Since she did not tolerate bisphosphonates would recommend prolia. Literature given.\par Will call with bw results.

## 2021-09-02 ENCOUNTER — NON-APPOINTMENT (OUTPATIENT)
Age: 86
End: 2021-09-02

## 2021-09-02 LAB
25(OH)D3 SERPL-MCNC: 26.9 NG/ML
CALCIUM SERPL-MCNC: 10.4 MG/DL

## 2021-09-20 ENCOUNTER — NON-APPOINTMENT (OUTPATIENT)
Age: 86
End: 2021-09-20

## 2021-10-14 ENCOUNTER — APPOINTMENT (OUTPATIENT)
Dept: OBGYN | Facility: CLINIC | Age: 86
End: 2021-10-14
Payer: MEDICARE

## 2021-10-14 VITALS
DIASTOLIC BLOOD PRESSURE: 62 MMHG | WEIGHT: 142 LBS | BODY MASS INDEX: 20.33 KG/M2 | SYSTOLIC BLOOD PRESSURE: 128 MMHG | HEIGHT: 70 IN

## 2021-10-14 PROCEDURE — 96372 THER/PROPH/DIAG INJ SC/IM: CPT

## 2021-10-14 RX ORDER — DENOSUMAB 60 MG/ML
60 INJECTION SUBCUTANEOUS
Qty: 1 | Refills: 0 | Status: COMPLETED | OUTPATIENT
Start: 2021-10-14

## 2021-10-14 RX ADMIN — DENOSUMAB 0 MG/ML: 60 INJECTION SUBCUTANEOUS at 00:00

## 2021-10-21 NOTE — PROGRESS NOTE ADULT - SUBJECTIVE AND OBJECTIVE BOX
83 yo WF with PMH HTN, Hypothyroidism, Cataracts, prior LT THR presented for elective RT TKR    3.8: pod#1, c/o right knee pain, no n/v/d  3.9: pod#2, Rt knee pain improved, able to climb stairs    REVIEW OF SYSTEMS:    CONSTITUTIONAL: No weakness, No fevers or chills  ENT: No ear ache, No sorethroat  NECK: No pain, No stiffness  RESPIRATORY: No cough, No wheezing, No hemoptysis; No dyspnea  CARDIOVASCULAR: No chest pain, No palpitations  GASTROINTESTINAL: No abd pain, No nausea, No vomiting, No hematemesis, No diarrhea or constipation. No melena, No hematochezia.  GENITOURINARY: No dysuria, No  hematuria  NEUROLOGICAL: No diplopia, No paresthesia, No motor dysfunction  SKIN: No rashes, or lesions   PSYCH: no anxiety, no suicidal ideation    All other review of systems is negative unless indicated above    Vital Signs Last 24 Hrs  T(C): 36.4, Max: 36.7 (03-08 @ 23:21)  T(F): 97.6, Max: 98 (03-08 @ 23:21)  HR: 69 (63 - 71)  BP: 131/69 (107/43 - 131/69)  BP(mean): 64 (64 - 64)  RR: 16 (16 - 16)  SpO2: 99% (97% - 100%)    PHYSICAL EXAM:    GENERAL: NAD, Well nourished  HEENT:  NC/AT, EOMI, PERRLA, No scleral icterus, Moist mucous membranes  NECK: Supple, No JVD  CNS:  Alert & Oriented X3, Motor Strength 5/5 B/L upper and lower extremities; DTRs 2+ intact   LUNG: Normal Breath sounds, Clear to auscultation bilaterally, No rales, No rhonchi, No wheezing  HEART: RRR; No murmurs, No rubs  ABDOMEN: +BS, ST/ND/NT  GENITOURINARY- Voiding, Bladder not distended  EXTREMITIES:  2+ Peripheral Pulses, No clubbing, No cyanosis, No tibial edema  MUSCULOSKELTAL: Rt knee surgical dressing clean; no hemorrhage, decreased ROM Rt knee  VAGINAL: deferred  RECTAL: deferred, not indicated  BREAST: deferred                          9.0    6.6   )-----------( 176      ( 09 Mar 2017 06:13 )             26.5     09 Mar 2017 06:13    142    |  107    |  21     ----------------------------<  103    4.4     |  26     |  0.69     Ca    8.8        09 Mar 2017 06:13      MEDICATIONS  (STANDING):  acetaminophen   Tablet. 650milliGRAM(s) Oral every 6 hours  celecoxib 200milliGRAM(s) Oral with breakfast  senna 2Tablet(s) Oral at bedtime  oxyCODONE ER Tablet 10milliGRAM(s) Oral once  tranexamic acid IVPB 662milliGRAM(s) IV Intermittent every 1 hour  sodium chloride 0.9% lock flush 3milliLiter(s) IV Push every 8 hours  morphine  - Injectable 4milliGRAM(s) IV Push once  polyethylene glycol 3350 17Gram(s) Oral daily  docusate sodium 100milliGRAM(s) Oral three times a day  ferrous    sulfate 325milliGRAM(s) Oral three times a day with meals  folic acid 1milliGRAM(s) Oral daily  multivitamin 1Tablet(s) Oral daily  ascorbic acid 500milliGRAM(s) Oral two times a day  lisinopril 10milliGRAM(s) Oral daily  metoprolol succinate ER 25milliGRAM(s) Oral daily  levothyroxine 112MICROGram(s) Oral daily  lactated ringers. 1000milliLiter(s) IV Continuous <Continuous>  oxyCODONE ER Tablet 10milliGRAM(s) Oral every 12 hours  warfarin 5milliGRAM(s) Oral daily  heparin  Injectable 5000Unit(s) SubCutaneous every 8 hours    MEDICATIONS  (PRN):  HYDROmorphone  Injectable 0.5milliGRAM(s) IV Push every 3 hours PRN Severe Pain (7 - 10)  fentaNYL    Injectable 50MICROGram(s) IV Push every 5 minutes PRN Severe Pain  ondansetron Injectable 4milliGRAM(s) IV Push once PRN Nausea and/or Vomiting  acetaminophen   Tablet 650milliGRAM(s) Oral every 6 hours PRN For Temp greater than 38 C (100.4 F)  oxyCODONE IR 5milliGRAM(s) Oral every 4 hours PRN Mild Pain  oxyCODONE IR 10milliGRAM(s) Oral every 4 hours PRN Moderate Pain  morphine  - Injectable 2milliGRAM(s) IV Push every 4 hours PRN Severe Pain  aluminum hydroxide/magnesium hydroxide/simethicone Suspension 30milliLiter(s) Oral four times a day PRN Indigestion  ondansetron Injectable 4milliGRAM(s) IV Push every 6 hours PRN Nausea and/or Vomiting  magnesium hydroxide Suspension 30milliLiter(s) Oral daily PRN Constipation  bisacodyl Suppository 10milliGRAM(s) Rectal daily PRN If no bowel movement by postoperative day #2  senna 2Tablet(s) Oral at bedtime PRN Constipation  codeine 15milliGRAM(s) Oral every 4 hours PRN Moderate Pain (4 - 6)  diphenhydrAMINE   Capsule 50milliGRAM(s) Oral every 6 hours PRN Insomnia              A/P:    1. Right Tkr: pod#2  cw analgesia with opiates prn  DVT prophy: Coumadin loading, Heparin Sq bridging  Incentive spirometry  PT evaluation      2. Htn:   cw Lopressor/Lisinopril    3. Hypothyroidism:   cw Synthroid [Hyperlipidemia] : hyperlipidemia [Hypertension] : hypertension

## 2022-04-14 ENCOUNTER — APPOINTMENT (OUTPATIENT)
Dept: OBGYN | Facility: CLINIC | Age: 87
End: 2022-04-14
Payer: MEDICARE

## 2022-04-14 VITALS
HEIGHT: 70 IN | DIASTOLIC BLOOD PRESSURE: 70 MMHG | WEIGHT: 147 LBS | BODY MASS INDEX: 21.05 KG/M2 | SYSTOLIC BLOOD PRESSURE: 120 MMHG

## 2022-04-14 PROCEDURE — 96372 THER/PROPH/DIAG INJ SC/IM: CPT

## 2022-04-20 RX ORDER — DENOSUMAB 60 MG/ML
60 INJECTION SUBCUTANEOUS
Qty: 1 | Refills: 0 | Status: COMPLETED | OUTPATIENT
Start: 2022-04-20

## 2022-04-20 RX ORDER — DENOSUMAB 60 MG/ML
60 INJECTION SUBCUTANEOUS
Refills: 0 | Status: COMPLETED | OUTPATIENT
Start: 2022-04-20

## 2022-04-20 RX ADMIN — DENOSUMAB 0 MG/ML: 60 INJECTION SUBCUTANEOUS at 00:00

## 2022-04-20 RX ADMIN — DENOSUMAB MG/ML: 60 INJECTION SUBCUTANEOUS at 00:00

## 2022-09-21 NOTE — H&P PST ADULT - PRO ARRIVE FROM
home Suturegard Intro: Intraoperative tissue expansion was performed, utilizing the SUTUREGARD device, in order to reduce wound tension.

## 2022-12-02 ENCOUNTER — APPOINTMENT (OUTPATIENT)
Dept: OBGYN | Facility: CLINIC | Age: 87
End: 2022-12-02

## 2023-04-18 ENCOUNTER — APPOINTMENT (OUTPATIENT)
Dept: OBGYN | Facility: CLINIC | Age: 88
End: 2023-04-18
Payer: MEDICARE

## 2023-04-18 VITALS
DIASTOLIC BLOOD PRESSURE: 60 MMHG | HEART RATE: 75 BPM | WEIGHT: 135 LBS | HEIGHT: 70 IN | BODY MASS INDEX: 19.33 KG/M2 | SYSTOLIC BLOOD PRESSURE: 120 MMHG | RESPIRATION RATE: 14 BRPM

## 2023-04-18 DIAGNOSIS — Z12.31 ENCOUNTER FOR SCREENING MAMMOGRAM FOR MALIGNANT NEOPLASM OF BREAST: ICD-10-CM

## 2023-04-18 DIAGNOSIS — M81.0 AGE-RELATED OSTEOPOROSIS W/OUT CURRENT PATHOLOGICAL FRACTURE: ICD-10-CM

## 2023-04-18 PROCEDURE — 99212 OFFICE O/P EST SF 10 MIN: CPT | Mod: 25

## 2023-04-18 PROCEDURE — 96372 THER/PROPH/DIAG INJ SC/IM: CPT

## 2023-04-18 RX ORDER — DENOSUMAB 60 MG/ML
60 INJECTION SUBCUTANEOUS
Qty: 1 | Refills: 0 | Status: COMPLETED | OUTPATIENT
Start: 2023-04-18

## 2023-04-18 RX ADMIN — DENOSUMAB 0 MG/ML: 60 INJECTION SUBCUTANEOUS at 00:00

## 2023-04-18 NOTE — HISTORY OF PRESENT ILLNESS
[FreeTextEntry1] : Pt here with son for prolia injection. last injection 1 yr ago. In the past year she has been dx with dementia. BMD due in August.

## 2023-10-24 ENCOUNTER — APPOINTMENT (OUTPATIENT)
Dept: OBGYN | Facility: CLINIC | Age: 88
End: 2023-10-24